# Patient Record
Sex: FEMALE | Race: WHITE | Employment: FULL TIME | URBAN - METROPOLITAN AREA
[De-identification: names, ages, dates, MRNs, and addresses within clinical notes are randomized per-mention and may not be internally consistent; named-entity substitution may affect disease eponyms.]

---

## 2017-01-18 ENCOUNTER — TELEPHONE (OUTPATIENT)
Dept: FAMILY MEDICINE CLINIC | Facility: CLINIC | Age: 57
End: 2017-01-18

## 2017-01-18 NOTE — TELEPHONE ENCOUNTER
----- Message from Kamla Marc MD sent at 12/23/2016  2:40 PM CST -----  Regarding: BP f/u  1 month ago I saw pt for sinusitis/bronchitis and her BP was high.  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

## 2017-01-25 RX ORDER — LISINOPRIL 20 MG/1
20 TABLET ORAL DAILY
Qty: 30 TABLET | Refills: 0 | Status: SHIPPED | OUTPATIENT
Start: 2017-01-25 | End: 2017-02-23 | Stop reason: SINTOL

## 2017-02-20 ENCOUNTER — HOSPITAL ENCOUNTER (OUTPATIENT)
Dept: MAMMOGRAPHY | Age: 57
Discharge: HOME | End: 2017-02-20
Attending: FAMILY MEDICINE
Payer: COMMERCIAL

## 2017-02-20 DIAGNOSIS — Z12.31 ENCOUNTER FOR SCREENING MAMMOGRAM FOR BREAST CANCER: ICD-10-CM

## 2017-02-20 PROCEDURE — 77067 SCR MAMMO BI INCL CAD: CPT

## 2017-02-22 ENCOUNTER — PATIENT MESSAGE (OUTPATIENT)
Dept: FAMILY MEDICINE CLINIC | Facility: CLINIC | Age: 57
End: 2017-02-22

## 2017-02-22 NOTE — TELEPHONE ENCOUNTER
From: Valente Xie  To: Tong Osullivan MD  Sent: 2/22/2017 7:10 AM CST  Subject: Prescription Question    Good morning Doc.  John Zazueta,  While talking with a good friend about getting her husbands blood pressure medication correct we were discussing my medicati

## 2017-02-23 ENCOUNTER — PATIENT MESSAGE (OUTPATIENT)
Dept: FAMILY MEDICINE CLINIC | Facility: CLINIC | Age: 57
End: 2017-02-23

## 2017-02-23 RX ORDER — LOSARTAN POTASSIUM 50 MG/1
50 TABLET ORAL DAILY
Qty: 30 TABLET | Refills: 1 | Status: SHIPPED | OUTPATIENT
Start: 2017-02-23 | End: 2017-04-14

## 2017-02-23 NOTE — TELEPHONE ENCOUNTER
From: Tristen Cabral  To: Willie Persaud MD  Sent: 2/23/2017 7:17 AM CST  Subject: Prescription Question    Doc Ash Caodaism,  If we could try that I would appreciate it.  My current prescription runs out in 7 days so I could try this new one and if it is crazy expe

## 2017-02-27 ENCOUNTER — HOSPITAL ENCOUNTER (OUTPATIENT)
Dept: MAMMOGRAPHY | Facility: HOSPITAL | Age: 57
Discharge: HOME | End: 2017-02-27
Attending: FAMILY MEDICINE
Payer: COMMERCIAL

## 2017-02-27 DIAGNOSIS — R92.2 INCONCLUSIVE MAMMOGRAM: ICD-10-CM

## 2017-02-27 PROCEDURE — 77065 DX MAMMO INCL CAD UNI: CPT

## 2017-04-12 ENCOUNTER — TELEPHONE (OUTPATIENT)
Dept: FAMILY MEDICINE CLINIC | Facility: CLINIC | Age: 57
End: 2017-04-12

## 2017-04-12 NOTE — TELEPHONE ENCOUNTER
Spoke with the pt started about 3 weeks ago- sore in the upper arm- feels like someone punched her in the arm, and she also gets numbness and tingling in the same arm sometimes the pain is so severe that it keeps her up at night.  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

## 2017-04-14 ENCOUNTER — OFFICE VISIT (OUTPATIENT)
Dept: FAMILY MEDICINE CLINIC | Facility: CLINIC | Age: 57
End: 2017-04-14

## 2017-04-14 ENCOUNTER — HOSPITAL ENCOUNTER (OUTPATIENT)
Dept: GENERAL RADIOLOGY | Age: 57
Discharge: HOME | End: 2017-04-14
Attending: INTERNAL MEDICINE
Payer: COMMERCIAL

## 2017-04-14 VITALS
SYSTOLIC BLOOD PRESSURE: 150 MMHG | HEART RATE: 102 BPM | BODY MASS INDEX: 27 KG/M2 | TEMPERATURE: 98 F | WEIGHT: 156 LBS | DIASTOLIC BLOOD PRESSURE: 102 MMHG

## 2017-04-14 DIAGNOSIS — M54.12 CERVICAL RADICULOPATHY: ICD-10-CM

## 2017-04-14 DIAGNOSIS — M79.601 RIGHT ARM PAIN: ICD-10-CM

## 2017-04-14 DIAGNOSIS — M06.09 RHEUMATOID ARTHRITIS OF MULTIPLE SITES WITH NEGATIVE RHEUMATOID FACTOR (HCC): ICD-10-CM

## 2017-04-14 DIAGNOSIS — I10 ESSENTIAL HYPERTENSION: Primary | ICD-10-CM

## 2017-04-14 PROCEDURE — 99213 OFFICE O/P EST LOW 20 MIN: CPT | Performed by: FAMILY MEDICINE

## 2017-04-14 PROCEDURE — 73060 X-RAY EXAM OF HUMERUS: CPT

## 2017-04-14 PROCEDURE — 72050 X-RAY EXAM NECK SPINE 4/5VWS: CPT

## 2017-04-14 RX ORDER — LOSARTAN POTASSIUM 100 MG/1
100 TABLET ORAL DAILY
Qty: 30 TABLET | Refills: 0 | Status: SHIPPED | OUTPATIENT
Start: 2017-04-14 | End: 2017-05-08 | Stop reason: ALTCHOICE

## 2017-04-14 NOTE — PROGRESS NOTES
Sandro Kern is a 60year old female. Patient presents with:  Blood Pressure: per pt       HPI:   HTN: 145/96 on Mon. 150/100 on Wed. 168/106 in the AM. Just switched from the lisinopril to losartan. No new or different headaches.  No chest pain or pr 120/90  10/03/16 : 130/80  09/28/16 : 138/88      Wt Readings from Last 6 Encounters:  04/14/17 : 156 lb  04/14/17 : 159 lb  12/23/16 : 157 lb 3.2 oz  12/14/16 : 158 lb 3.2 oz  09/28/16 : 158 lb 8 oz  08/24/16 : 153 lb 6.4 oz      REVIEW OF SYSTEMS:   GENE

## 2017-04-20 ENCOUNTER — NURSE ONLY (OUTPATIENT)
Dept: FAMILY MEDICINE CLINIC | Facility: CLINIC | Age: 57
End: 2017-04-20

## 2017-04-20 VITALS — DIASTOLIC BLOOD PRESSURE: 90 MMHG | SYSTOLIC BLOOD PRESSURE: 132 MMHG

## 2017-04-20 NOTE — PROGRESS NOTES
Patient states she has only been taking medication for 6 days. She states she is feeling fine. Notified patient that we would forward this to [facility] and call her with further guidance.

## 2017-04-21 ENCOUNTER — OFFICE VISIT (OUTPATIENT)
Dept: PHYSICAL THERAPY | Age: 57
End: 2017-04-21
Attending: INTERNAL MEDICINE
Payer: COMMERCIAL

## 2017-04-21 DIAGNOSIS — M06.09 RHEUMATOID ARTHRITIS OF MULTIPLE SITES WITH NEGATIVE RHEUMATOID FACTOR (HCC): ICD-10-CM

## 2017-04-21 DIAGNOSIS — M54.12 CERVICAL RADICULOPATHY: Primary | ICD-10-CM

## 2017-04-21 DIAGNOSIS — M79.601 RIGHT ARM PAIN: ICD-10-CM

## 2017-04-21 PROCEDURE — 97140 MANUAL THERAPY 1/> REGIONS: CPT

## 2017-04-21 PROCEDURE — 97162 PT EVAL MOD COMPLEX 30 MIN: CPT

## 2017-04-21 NOTE — PROGRESS NOTES
INITIAL EVALUATION:   Referring Physician: Dr. Mirna Geller  Diagnosis: Cervical radiculopathy (M54.12)  Rheumatoid arthritis of multiple sites with negative rheumatoid factor (HCC) (M06.09)  Right arm pain (M79.601)       Date of Service: 4/21/2017     PATIENT S function    Precautions:  RA  OBJECTIVE:   Observation/Posture:  The patient sits with fair posture with a forward and rounded shoulders    Gait: The patient ambulates with no significant gait deviations    Sensation: Light touch intact in bilateral upper e Neuromuscular Re-education; Therapeutic Activity;  Electrical Stim    Education or treatment limitation: None  Rehab Potential:good    Patient/Family/Caregiver was advised of these findings, precautions, and treatment options and has agreed to actively part

## 2017-04-21 NOTE — PROGRESS NOTES
Left message on cell phone [phone]  For the pt that her BP is better and to come in for a nurse visit in 2 weeks for a BP check- advised to call if questions

## 2017-04-25 ENCOUNTER — OFFICE VISIT (OUTPATIENT)
Dept: PHYSICAL THERAPY | Age: 57
End: 2017-04-25
Attending: INTERNAL MEDICINE
Payer: COMMERCIAL

## 2017-04-25 DIAGNOSIS — M06.09 RHEUMATOID ARTHRITIS OF MULTIPLE SITES WITH NEGATIVE RHEUMATOID FACTOR (HCC): ICD-10-CM

## 2017-04-25 DIAGNOSIS — M79.601 RIGHT ARM PAIN: ICD-10-CM

## 2017-04-25 DIAGNOSIS — M54.12 CERVICAL RADICULOPATHY: Primary | ICD-10-CM

## 2017-04-25 PROCEDURE — 97110 THERAPEUTIC EXERCISES: CPT

## 2017-04-25 PROCEDURE — 97140 MANUAL THERAPY 1/> REGIONS: CPT

## 2017-04-25 NOTE — PROGRESS NOTES
Dx: Cervical radiculopathy (M54.12)  Rheumatoid arthritis of multiple sites with negative rheumatoid factor (HCC) (M06.09)  Right arm pain (M79.601)        Authorized # of Visits:  10         Next MD visit: none scheduled  Fall Risk: standard         Preca

## 2017-05-02 ENCOUNTER — OFFICE VISIT (OUTPATIENT)
Dept: PHYSICAL THERAPY | Age: 57
End: 2017-05-02
Attending: INTERNAL MEDICINE
Payer: COMMERCIAL

## 2017-05-02 DIAGNOSIS — M79.601 RIGHT ARM PAIN: ICD-10-CM

## 2017-05-02 DIAGNOSIS — M06.09 RHEUMATOID ARTHRITIS OF MULTIPLE SITES WITH NEGATIVE RHEUMATOID FACTOR (HCC): ICD-10-CM

## 2017-05-02 DIAGNOSIS — M54.12 CERVICAL RADICULOPATHY: Primary | ICD-10-CM

## 2017-05-02 PROCEDURE — 97140 MANUAL THERAPY 1/> REGIONS: CPT

## 2017-05-02 PROCEDURE — 97110 THERAPEUTIC EXERCISES: CPT

## 2017-05-02 NOTE — PROGRESS NOTES
Dx: Cervical radiculopathy (M54.12)  Rheumatoid arthritis of multiple sites with negative rheumatoid factor (HCC) (M06.09)  Right arm pain (M79.601)        Authorized # of Visits:  10         Next MD visit: none scheduled  Fall Risk: standard         Preca 2       Total Timed Treatment: 35 min  Total Treatment Time: 35 min

## 2017-05-05 ENCOUNTER — NURSE ONLY (OUTPATIENT)
Dept: FAMILY MEDICINE CLINIC | Facility: CLINIC | Age: 57
End: 2017-05-05

## 2017-05-05 ENCOUNTER — OFFICE VISIT (OUTPATIENT)
Dept: PHYSICAL THERAPY | Age: 57
End: 2017-05-05
Attending: INTERNAL MEDICINE
Payer: COMMERCIAL

## 2017-05-05 VITALS — DIASTOLIC BLOOD PRESSURE: 84 MMHG | SYSTOLIC BLOOD PRESSURE: 160 MMHG

## 2017-05-05 DIAGNOSIS — M54.12 CERVICAL RADICULOPATHY: Primary | ICD-10-CM

## 2017-05-05 DIAGNOSIS — M06.09 RHEUMATOID ARTHRITIS OF MULTIPLE SITES WITH NEGATIVE RHEUMATOID FACTOR (HCC): ICD-10-CM

## 2017-05-05 DIAGNOSIS — Z02.9 ENCOUNTERS FOR ADMINISTRATIVE PURPOSE: Primary | ICD-10-CM

## 2017-05-05 DIAGNOSIS — M79.601 RIGHT ARM PAIN: ICD-10-CM

## 2017-05-05 PROCEDURE — 97140 MANUAL THERAPY 1/> REGIONS: CPT

## 2017-05-05 PROCEDURE — 97110 THERAPEUTIC EXERCISES: CPT

## 2017-05-05 NOTE — PROGRESS NOTES
Pt here for a blood pressure check. Pt was at physical therapy at 3pm, then sat for about 30 mins when I called her back. I took the first bp on her left arm with a adult cuff 160/90. She denied headache, chest pain, SOB.  I talked to Dr. Sathya Wheeler, she wanted h

## 2017-05-05 NOTE — PROGRESS NOTES
Dx: Cervical radiculopathy (M54.12)  Rheumatoid arthritis of multiple sites with negative rheumatoid factor (HCC) (M06.09)  Right arm pain (M79.601)        Authorized # of Visits:  10         Next MD visit: none scheduled  Fall Risk: standard         Preca will be discharged from outpatient physical therapy and continue independently with a home exercise program      Date: 4/25/2017  Tx#: 2/10 Date:  5/2/2017   Tx#: 3/10 Date:  5/5/2017   Tx#: 4/10 Date: Tx#: 5/ Date: Tx#: 6/ Date: Tx#: 7/ Date:    Tx#:

## 2017-05-05 NOTE — PROGRESS NOTES
BP not quite where it should be. I'd like to add a mild diuretic to her losartan. She may have to urinate more at first, but body often adjusts.  We can add it to her losartan, combined in one pill losartan/hctz 100/25mg #30 1 refill, schedule nurse visit i

## 2017-05-08 ENCOUNTER — TELEPHONE (OUTPATIENT)
Dept: FAMILY MEDICINE CLINIC | Facility: CLINIC | Age: 57
End: 2017-05-08

## 2017-05-08 RX ORDER — LOSARTAN POTASSIUM AND HYDROCHLOROTHIAZIDE 25; 100 MG/1; MG/1
1 TABLET ORAL DAILY
Qty: 30 TABLET | Refills: 1 | Status: SHIPPED | OUTPATIENT
Start: 2017-05-08 | End: 2017-07-03

## 2017-05-08 NOTE — TELEPHONE ENCOUNTER
Progress Notes by Lady Aguilar MD at 5/5/2017  5:34 PM        Author: Lady Aguilar MD Author Type: Physician Filed: 5/5/2017  5:36 PM       Note Status: Signed Cosign: Cosign Not Required Note Time: 5/5/2017  5:34 PM       : Lady Aguilar MD (Ph

## 2017-05-08 NOTE — TELEPHONE ENCOUNTER
Patient notified and verbalized understanding. Requests script to [facility].  -RX sent  Nurse visit scheduled  Future Appointments  Date Time Provider Haroon Collins   5/22/2017 3:00 PM [facility] NURSE DIANE Rosales   8/10/2017 3:30 PM S

## 2017-05-22 ENCOUNTER — NURSE ONLY (OUTPATIENT)
Dept: FAMILY MEDICINE CLINIC | Facility: CLINIC | Age: 57
End: 2017-05-22

## 2017-05-22 VITALS — DIASTOLIC BLOOD PRESSURE: 88 MMHG | SYSTOLIC BLOOD PRESSURE: 140 MMHG

## 2017-05-22 DIAGNOSIS — Z02.9 ENCOUNTERS FOR ADMINISTRATIVE PURPOSE: Primary | ICD-10-CM

## 2017-05-22 NOTE — PROGRESS NOTES
Pt here for BP check- she is on new medication- lostartan hctz  She denies any lightheeaded, chest pain, headache    She doesn't check her BP at home

## 2017-05-22 NOTE — PROGRESS NOTES
BP much better, still a bit high, but no danger zone. Does she have anything else to work on to help bring it down (stress reduction, enough sleep, limiting salt, eating lots of plants, getting exercise)?  If she does have stuff to work on do that first and

## 2017-07-03 RX ORDER — LOSARTAN POTASSIUM AND HYDROCHLOROTHIAZIDE 25; 100 MG/1; MG/1
TABLET ORAL
Qty: 30 TABLET | Refills: 0 | Status: SHIPPED | OUTPATIENT
Start: 2017-07-03 | End: 2017-08-03

## 2017-07-03 NOTE — TELEPHONE ENCOUNTER
Last OV 4/14/17, Future Appointments  Date Time Provider Haroon Karina   8/10/2017 3:30 PM Warren Olmedo MD [facility]   8/28/2017 3:20 PM Anna Reese MD [facility]       Last rx given 5/8/17

## 2017-07-29 RX ORDER — LOSARTAN POTASSIUM AND HYDROCHLOROTHIAZIDE 25; 100 MG/1; MG/1
TABLET ORAL
Qty: 30 TABLET | Refills: 0 | OUTPATIENT
Start: 2017-07-29

## 2017-08-16 ENCOUNTER — TELEPHONE (OUTPATIENT)
Dept: FAMILY MEDICINE CLINIC | Facility: CLINIC | Age: 57
End: 2017-08-16

## 2017-08-16 ENCOUNTER — HOSPITAL ENCOUNTER (OUTPATIENT)
Dept: MAMMOGRAPHY | Facility: HOSPITAL | Age: 57
Discharge: HOME | End: 2017-08-16
Attending: FAMILY MEDICINE
Payer: COMMERCIAL

## 2017-08-16 DIAGNOSIS — R92.8 ABNORMAL MAMMOGRAM OF LEFT BREAST: ICD-10-CM

## 2017-08-16 PROCEDURE — 77065 DX MAMMO INCL CAD UNI: CPT | Performed by: FAMILY MEDICINE

## 2017-08-16 PROCEDURE — 77061 BREAST TOMOSYNTHESIS UNI: CPT | Performed by: FAMILY MEDICINE

## 2017-08-16 NOTE — IMAGING NOTE
Asssisted Dr. Pedro Layton with recommendation for a left stereotactic breast biopsy for calcifications. Emotional and educational support provided. Written information provided to [employer].  Our breast center schedulers will call pt within 72 hours to aim

## 2017-08-16 NOTE — TELEPHONE ENCOUNTER
[facility] called and spoke with Dr. Ailyn Moreno- he is recommending a biopsy of the left breast- there is a tiny cluster of calcifications- he believes to be benign but want to be cautious.  They will be sending the request

## 2017-08-21 ENCOUNTER — HOSPITAL ENCOUNTER (OUTPATIENT)
Dept: MAMMOGRAPHY | Facility: HOSPITAL | Age: 57
Discharge: HOME | End: 2017-08-21
Attending: FAMILY MEDICINE
Payer: COMMERCIAL

## 2017-08-21 DIAGNOSIS — R92.1 BREAST CALCIFICATIONS: ICD-10-CM

## 2017-08-21 PROCEDURE — 88305 TISSUE EXAM BY PATHOLOGIST: CPT | Performed by: FAMILY MEDICINE

## 2017-08-21 PROCEDURE — 19081 BX BREAST 1ST LESION STRTCTC: CPT | Performed by: FAMILY MEDICINE

## 2017-08-23 ENCOUNTER — TELEPHONE (OUTPATIENT)
Dept: MAMMOGRAPHY | Facility: HOSPITAL | Age: 57
End: 2017-08-23

## 2017-08-23 NOTE — TELEPHONE ENCOUNTER
Rec'd call back from Health system and name,  verified with pt. Notified Health system of benign left breast stereotactic biopsy result. Health system reports biopsy site is healing well. Hematoma management discussed.  Radiologist recommends next mammo

## 2017-08-28 ENCOUNTER — MED REC SCAN ONLY (OUTPATIENT)
Dept: FAMILY MEDICINE CLINIC | Facility: CLINIC | Age: 57
End: 2017-08-28

## 2017-10-25 RX ORDER — LOSARTAN POTASSIUM AND HYDROCHLOROTHIAZIDE 25; 100 MG/1; MG/1
TABLET ORAL
Qty: 30 TABLET | Refills: 5 | Status: SHIPPED | OUTPATIENT
Start: 2017-10-25 | End: 2018-05-05

## 2017-10-25 NOTE — TELEPHONE ENCOUNTER
Last OV 4/14/17, Future Appointments  Date Time Provider Haroon Karina   2/28/2018 3:20 PM Jonelle Carlin MD [facility]   9/6/2018 3:30 PM MD Donte Lucero       Last rx given 8/3/17

## 2018-02-28 PROCEDURE — 81001 URINALYSIS AUTO W/SCOPE: CPT | Performed by: INTERNAL MEDICINE

## 2018-02-28 PROCEDURE — 84156 ASSAY OF PROTEIN URINE: CPT | Performed by: INTERNAL MEDICINE

## 2018-02-28 PROCEDURE — 82570 ASSAY OF URINE CREATININE: CPT | Performed by: INTERNAL MEDICINE

## 2018-03-05 ENCOUNTER — HOSPITAL ENCOUNTER (OUTPATIENT)
Dept: MAMMOGRAPHY | Facility: HOSPITAL | Age: 58
Discharge: HOME | End: 2018-03-05
Attending: FAMILY MEDICINE
Payer: COMMERCIAL

## 2018-03-05 DIAGNOSIS — R92.8 ABNORMAL MAMMOGRAM OF LEFT BREAST: ICD-10-CM

## 2018-03-05 PROCEDURE — 77061 BREAST TOMOSYNTHESIS UNI: CPT | Performed by: FAMILY MEDICINE

## 2018-03-05 PROCEDURE — 77065 DX MAMMO INCL CAD UNI: CPT | Performed by: FAMILY MEDICINE

## 2018-05-05 RX ORDER — LOSARTAN POTASSIUM AND HYDROCHLOROTHIAZIDE 25; 100 MG/1; MG/1
TABLET ORAL
Qty: 30 TABLET | Refills: 4 | Status: SHIPPED | OUTPATIENT
Start: 2018-05-05 | End: 2018-10-04

## 2018-05-05 NOTE — TELEPHONE ENCOUNTER
Last refilled on 10/25/17 for # 30 with 5 refills  Last BUN 22, creatinine 0.86 on 2/28/18  Last seen on 4/14/17  /90 on 2/28/18  Future Appointments  Date Time Provider Haroon Collins   5/9/2018 2:45 PM Aneesh Bartlett MD [facility]   8/28

## 2018-05-09 ENCOUNTER — OFFICE VISIT (OUTPATIENT)
Dept: FAMILY MEDICINE CLINIC | Facility: CLINIC | Age: 58
End: 2018-05-09

## 2018-05-09 ENCOUNTER — TELEPHONE (OUTPATIENT)
Dept: FAMILY MEDICINE CLINIC | Facility: CLINIC | Age: 58
End: 2018-05-09

## 2018-05-09 VITALS
TEMPERATURE: 98 F | SYSTOLIC BLOOD PRESSURE: 140 MMHG | BODY MASS INDEX: 27.65 KG/M2 | DIASTOLIC BLOOD PRESSURE: 90 MMHG | HEART RATE: 64 BPM | WEIGHT: 158 LBS | HEIGHT: 63.5 IN | RESPIRATION RATE: 14 BRPM

## 2018-05-09 DIAGNOSIS — R68.89 DECREASED EXERCISE TOLERANCE: ICD-10-CM

## 2018-05-09 DIAGNOSIS — Z13.29 THYROID DISORDER SCREEN: ICD-10-CM

## 2018-05-09 DIAGNOSIS — Z12.39 SCREENING BREAST EXAMINATION: ICD-10-CM

## 2018-05-09 DIAGNOSIS — M06.00 SERONEGATIVE RHEUMATOID ARTHRITIS (HCC): ICD-10-CM

## 2018-05-09 DIAGNOSIS — M25.562 CHRONIC PAIN OF LEFT KNEE: ICD-10-CM

## 2018-05-09 DIAGNOSIS — G89.29 CHRONIC PAIN OF LEFT KNEE: ICD-10-CM

## 2018-05-09 DIAGNOSIS — I83.93 VARICOSE VEINS OF BOTH LOWER EXTREMITIES: ICD-10-CM

## 2018-05-09 DIAGNOSIS — I10 ESSENTIAL HYPERTENSION: Primary | ICD-10-CM

## 2018-05-09 DIAGNOSIS — I10 ESSENTIAL HYPERTENSION, BENIGN: ICD-10-CM

## 2018-05-09 DIAGNOSIS — Z12.4 CERVICAL CANCER SCREENING: ICD-10-CM

## 2018-05-09 DIAGNOSIS — Z13.220 LIPID SCREENING: ICD-10-CM

## 2018-05-09 DIAGNOSIS — Z13.21 ENCOUNTER FOR VITAMIN DEFICIENCY SCREENING: ICD-10-CM

## 2018-05-09 DIAGNOSIS — Z00.00 ROUTINE HISTORY AND PHYSICAL EXAMINATION OF ADULT: Primary | ICD-10-CM

## 2018-05-09 DIAGNOSIS — Z91.030 BEE STING ALLERGY: ICD-10-CM

## 2018-05-09 DIAGNOSIS — Z13.1 DIABETES MELLITUS SCREENING: ICD-10-CM

## 2018-05-09 PROCEDURE — 36415 COLL VENOUS BLD VENIPUNCTURE: CPT | Performed by: FAMILY MEDICINE

## 2018-05-09 PROCEDURE — 80061 LIPID PANEL: CPT | Performed by: FAMILY MEDICINE

## 2018-05-09 PROCEDURE — 82306 VITAMIN D 25 HYDROXY: CPT | Performed by: FAMILY MEDICINE

## 2018-05-09 PROCEDURE — 84443 ASSAY THYROID STIM HORMONE: CPT | Performed by: FAMILY MEDICINE

## 2018-05-09 PROCEDURE — 99396 PREV VISIT EST AGE 40-64: CPT | Performed by: FAMILY MEDICINE

## 2018-05-09 PROCEDURE — 88175 CYTOPATH C/V AUTO FLUID REDO: CPT | Performed by: FAMILY MEDICINE

## 2018-05-09 PROCEDURE — 87624 HPV HI-RISK TYP POOLED RSLT: CPT | Performed by: FAMILY MEDICINE

## 2018-05-09 PROCEDURE — 83036 HEMOGLOBIN GLYCOSYLATED A1C: CPT | Performed by: FAMILY MEDICINE

## 2018-05-09 RX ORDER — AMINO ACIDS/MV,IRON,MIN
TABLET ORAL
COMMUNITY

## 2018-05-09 RX ORDER — EPINEPHRINE 0.3 MG/.3ML
0.3 INJECTION SUBCUTANEOUS ONCE
Qty: 1 EACH | Refills: 0 | Status: SHIPPED | OUTPATIENT
Start: 2018-05-09 | End: 2018-05-09

## 2018-05-09 RX ORDER — SPIRONOLACTONE 50 MG/1
TABLET, FILM COATED ORAL
Qty: 30 TABLET | Refills: 0 | Status: SHIPPED | OUTPATIENT
Start: 2018-05-09 | End: 2018-06-08

## 2018-05-09 NOTE — TELEPHONE ENCOUNTER
Nurse visit on 6/11 for BP and blood work  No orders in [employer]. Thanks!     Future Appointments  Date Time Provider Haroon Collins   6/11/2018 11:00 AM [facility] [place] NURSE [facility] Mat[facility] Carlin   8/28/2018 3:40 PM Marilyn Cantu MD [facility]

## 2018-05-09 NOTE — PROGRESS NOTES
HPI:   Isreal Tse is a 59year old female who presents for a complete physical exam.  Patient complains of a few things for me:    1) She updates me rheum is treating her for OA nad RA, on plaquenil (getting regular eye exams) and sulfsalazine, aleve fo Value   02/28/2018 28   08/28/2017 28   04/14/2017 35   ----------  ALT (SGPT) (IU/L)   Date Value   06/20/2016 22   02/24/2016 17   11/24/2015 19   ----------  ALT (U/L)   Date Value   02/28/2018 30   08/28/2017 31   04/14/2017 39   ----------       Wong Womack Packs/day: 0.00      Years: 2.00         Quit date: 1/1/1980  Smokeless tobacco: Never Used                      Alcohol use: Yes           0.0 oz/week     Comment: 1-2/day    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 The patient is asked to return for CPX in 1 year.   Call in 1 week for results if hasn't heard from us by then

## 2018-05-09 NOTE — TELEPHONE ENCOUNTER
FYI:    PT COMING IN ON 6/11/18  NURSE CHRISTINA FOR F/U BP AND BLOOD WORK  PLEASE PLACE ORDER    THANK ANABELA BARFIELD

## 2018-06-09 RX ORDER — SPIRONOLACTONE 50 MG/1
50 TABLET, FILM COATED ORAL DAILY
Qty: 30 TABLET | Refills: 0 | Status: SHIPPED | OUTPATIENT
Start: 2018-06-09 | End: 2018-07-09

## 2018-06-09 NOTE — TELEPHONE ENCOUNTER
Patient advised. States she has an appointment scheduled.    Future Appointments  Date Time Provider Haroon Collins   6/11/2018 11:00 AM [facility] NURSE DIANE Woo   6/27/2018 11:00 AM YK CARD TM/STRESS/ECHO RM 1 YK CARD Liz   8/28/201

## 2018-06-11 ENCOUNTER — NURSE ONLY (OUTPATIENT)
Dept: FAMILY MEDICINE CLINIC | Facility: CLINIC | Age: 58
End: 2018-06-11

## 2018-06-11 VITALS — DIASTOLIC BLOOD PRESSURE: 74 MMHG | SYSTOLIC BLOOD PRESSURE: 128 MMHG

## 2018-06-11 DIAGNOSIS — I10 ESSENTIAL HYPERTENSION: ICD-10-CM

## 2018-06-11 PROCEDURE — 36415 COLL VENOUS BLD VENIPUNCTURE: CPT | Performed by: FAMILY MEDICINE

## 2018-06-11 PROCEDURE — 80048 BASIC METABOLIC PNL TOTAL CA: CPT | Performed by: FAMILY MEDICINE

## 2018-06-11 NOTE — PROGRESS NOTES
Patient in office for BP check and BMP. Patient was drawn in the right arm 1 mint tube collected. Patient tolerated well. BP was checked and 128/74 instructed patient I would send to [address] and we would call with any recommendations.  Patient notified and v

## 2018-06-27 ENCOUNTER — HOSPITAL ENCOUNTER (OUTPATIENT)
Dept: CV DIAGNOSTICS | Age: 58
Discharge: HOME | End: 2018-06-27
Attending: FAMILY MEDICINE
Payer: COMMERCIAL

## 2018-06-27 DIAGNOSIS — R68.89 DECREASED EXERCISE TOLERANCE: ICD-10-CM

## 2018-06-27 DIAGNOSIS — I10 ESSENTIAL HYPERTENSION, BENIGN: ICD-10-CM

## 2018-06-27 PROCEDURE — 93350 STRESS TTE ONLY: CPT | Performed by: FAMILY MEDICINE

## 2018-06-27 PROCEDURE — 93017 CV STRESS TEST TRACING ONLY: CPT | Performed by: FAMILY MEDICINE

## 2018-06-27 PROCEDURE — 93018 CV STRESS TEST I&R ONLY: CPT | Performed by: FAMILY MEDICINE

## 2018-06-28 ENCOUNTER — TELEPHONE (OUTPATIENT)
Dept: FAMILY MEDICINE CLINIC | Facility: CLINIC | Age: 58
End: 2018-06-28

## 2018-06-28 NOTE — TELEPHONE ENCOUNTER
----- Message from Konstantin Yarbrough DO sent at 6/28/2018  8:06 AM CDT -----  Can notify Graeme Bud, she had a  Very good and negative stress test, no significant  EKG changes a couple of palpitations   But other than that, it looked great as did the US part of the

## 2018-07-03 ENCOUNTER — APPOINTMENT (OUTPATIENT)
Dept: GENERAL RADIOLOGY | Age: 58
End: 2018-07-03
Attending: FAMILY MEDICINE
Payer: COMMERCIAL

## 2018-07-03 ENCOUNTER — HOSPITAL ENCOUNTER (OUTPATIENT)
Age: 58
Discharge: HOME | End: 2018-07-03
Attending: FAMILY MEDICINE
Payer: COMMERCIAL

## 2018-07-03 VITALS
TEMPERATURE: 98 F | OXYGEN SATURATION: 99 % | HEART RATE: 66 BPM | DIASTOLIC BLOOD PRESSURE: 89 MMHG | SYSTOLIC BLOOD PRESSURE: 138 MMHG | BODY MASS INDEX: 27 KG/M2 | WEIGHT: 154 LBS | RESPIRATION RATE: 16 BRPM

## 2018-07-03 DIAGNOSIS — S79.922A INJURY OF LEFT THIGH, INITIAL ENCOUNTER: ICD-10-CM

## 2018-07-03 DIAGNOSIS — S76.312A HAMSTRING MUSCLE STRAIN, LEFT, INITIAL ENCOUNTER: Primary | ICD-10-CM

## 2018-07-03 PROCEDURE — 96372 THER/PROPH/DIAG INJ SC/IM: CPT

## 2018-07-03 PROCEDURE — 99214 OFFICE O/P EST MOD 30 MIN: CPT

## 2018-07-03 PROCEDURE — 99204 OFFICE O/P NEW MOD 45 MIN: CPT

## 2018-07-03 PROCEDURE — 73552 X-RAY EXAM OF FEMUR 2/>: CPT | Performed by: FAMILY MEDICINE

## 2018-07-03 RX ORDER — HYDROCODONE BITARTRATE AND ACETAMINOPHEN 5; 325 MG/1; MG/1
1 TABLET ORAL AS NEEDED
Qty: 10 TABLET | Refills: 0 | Status: SHIPPED | OUTPATIENT
Start: 2018-07-03 | End: 2019-09-17

## 2018-07-03 RX ORDER — METAXALONE 800 MG/1
800 TABLET ORAL 3 TIMES DAILY
Qty: 21 TABLET | Refills: 0 | Status: SHIPPED | OUTPATIENT
Start: 2018-07-03 | End: 2018-07-10

## 2018-07-03 RX ORDER — KETOROLAC TROMETHAMINE 30 MG/ML
60 INJECTION, SOLUTION INTRAMUSCULAR; INTRAVENOUS ONCE
Status: COMPLETED | OUTPATIENT
Start: 2018-07-03 | End: 2018-07-03
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
## 2018-07-03 NOTE — ED PROVIDER NOTES
Patient Seen [place] [id number] [facility]    History   Patient presents with:  Fall (musculoskeletal, neurologic)  Pain (neurologic)    Stated Complaint: leg pain/fell     HPI    42-year-old female presents to the immediate care today with chief c Device: n/a    Current:/89   Pulse 66   Temp 98.3 °F (36.8 °C) (Temporal)   Resp 16   Wt 69.9 kg   SpO2 99%   BMI 26.85 kg/m²         Physical Exam    GENERAL: well developed, well nourished,in no apparent distress  SKIN: no rashes,no suspicious lesi CONCLUSION:  No evidence of acute fracture or dislocation. If clinical symptoms persist then consider MRI.    Dictated by: Angelina Perez MD on 7/03/2018 at 10:39     Approved by: Angelina Perez MD           ED Course as of Jul 03 1228  ----------------------- not improve        Medications Prescribed:  Discharge Medication List as of 7/3/2018 11:03 AM    START taking these medications    Metaxalone (SKELAXIN) 800 MG Oral Tab  Take 1 tablet (800 mg total) by mouth 3 (three) times daily. , Normal, Disp-21 tablet,

## 2018-07-09 NOTE — TELEPHONE ENCOUNTER
Last refilled on 6/9/18 for # 30 with 0 refills  Last BUN 21, creatinine 0.784 on 6/11/18  Last seen on 5/9/18, /74  Future Appointments  Date Time Provider Haroon Collins   8/28/2018 3:40 PM Obed Meléndez MD [facility]   9/6/2018 3:30

## 2018-07-10 RX ORDER — SPIRONOLACTONE 50 MG/1
TABLET, FILM COATED ORAL
Qty: 90 TABLET | Refills: 3 | Status: SHIPPED | OUTPATIENT
Start: 2018-07-10 | End: 2019-07-02

## 2018-10-04 RX ORDER — LOSARTAN POTASSIUM AND HYDROCHLOROTHIAZIDE 25; 100 MG/1; MG/1
TABLET ORAL
Qty: 90 TABLET | Refills: 1 | Status: SHIPPED | OUTPATIENT
Start: 2018-10-04 | End: 2019-04-01

## 2018-10-04 NOTE — TELEPHONE ENCOUNTER
Last refilled on 5/5/18 for # 30 with 4 refills  Last BUN 27, creatinine 0.77 on 8/28/18  Last seen on 5/9/18, /72  Future Appointments   Date Time Provider Haroon Collins   2/26/2019  3:20 PM Moises Gipson MD [facility]   9/12/2019  3:

## 2018-11-12 ENCOUNTER — TELEPHONE (OUTPATIENT)
Dept: FAMILY MEDICINE CLINIC | Facility: CLINIC | Age: 58
End: 2018-11-12

## 2018-11-12 NOTE — TELEPHONE ENCOUNTER
Letter mailed to patient to call office regarding a potential recall of Losartan/HCTZ. Patient should contact their pharmacy and or providers for alternative treatment prior to returning their medications.    Patient should continue taking medication, as

## 2018-11-19 ENCOUNTER — TELEPHONE (OUTPATIENT)
Dept: FAMILY MEDICINE CLINIC | Facility: CLINIC | Age: 58
End: 2018-11-19

## 2018-11-19 NOTE — TELEPHONE ENCOUNTER
Spoke with the pt and advised that her BP medication losartan/hctz could possibly be involved with a recall and that she needs to call her pharmacy to see if her medication is affected by the recall- she v/u

## 2018-11-19 NOTE — TELEPHONE ENCOUNTER
Patient received a letter from our office stating that one of her medications has been recalled and to call our office regarding this. Please call patient back to advise.

## 2019-03-05 ENCOUNTER — TELEPHONE (OUTPATIENT)
Dept: FAMILY MEDICINE CLINIC | Facility: CLINIC | Age: 59
End: 2019-03-05

## 2019-03-05 DIAGNOSIS — Z12.31 SCREENING MAMMOGRAM, ENCOUNTER FOR: Primary | ICD-10-CM

## 2019-03-05 NOTE — TELEPHONE ENCOUNTER
PT WOULD LIKE TO SCHEDULE YEARLY MAMMOGRAM. LAST MAMMO  DONE ON 3/5/18    PLEASE PLACE ORDER SO PT CAN SCHEDULE    THANK YOU

## 2019-03-14 ENCOUNTER — HOSPITAL ENCOUNTER (OUTPATIENT)
Dept: MAMMOGRAPHY | Age: 59
Discharge: HOME | End: 2019-03-14
Attending: FAMILY MEDICINE
Payer: COMMERCIAL

## 2019-03-14 DIAGNOSIS — Z12.31 SCREENING MAMMOGRAM, ENCOUNTER FOR: ICD-10-CM

## 2019-03-14 PROCEDURE — 77067 SCR MAMMO BI INCL CAD: CPT | Performed by: FAMILY MEDICINE

## 2019-04-01 RX ORDER — LOSARTAN POTASSIUM AND HYDROCHLOROTHIAZIDE 25; 100 MG/1; MG/1
TABLET ORAL
Qty: 90 TABLET | Refills: 0 | Status: SHIPPED | OUTPATIENT
Start: 2019-04-01 | End: 2019-07-02

## 2019-04-01 NOTE — TELEPHONE ENCOUNTER
Pt advised and v/u. Appointment scheduled.     Future Appointments   Date Time Provider Haroon Collins   5/1/2019  2:30 PM Mandy Decker MD [facility]   9/12/2019  3:00 PM Altagracia Hagan MD [facility]   9/17/2019  3:20 PM Bradford Solorzano

## 2019-04-01 NOTE — TELEPHONE ENCOUNTER
Last refilled on 10/4/18 for # 90 with 1 refills  Last OV 5/9/18, /76  Future Appointments   Date Time Provider Haroon Karina   9/12/2019  3:00 PM Neville Cullen MD [facility]   9/17/2019  3:20 PM Yasmin De Oliveira MD [facility]

## 2019-04-22 ENCOUNTER — MED REC SCAN ONLY (OUTPATIENT)
Dept: FAMILY MEDICINE CLINIC | Facility: CLINIC | Age: 59
End: 2019-04-22

## 2019-05-01 ENCOUNTER — OFFICE VISIT (OUTPATIENT)
Dept: FAMILY MEDICINE CLINIC | Facility: CLINIC | Age: 59
End: 2019-05-01
Payer: COMMERCIAL

## 2019-05-01 VITALS
HEART RATE: 62 BPM | DIASTOLIC BLOOD PRESSURE: 74 MMHG | WEIGHT: 157.63 LBS | SYSTOLIC BLOOD PRESSURE: 110 MMHG | TEMPERATURE: 98 F | BODY MASS INDEX: 27.58 KG/M2 | RESPIRATION RATE: 12 BRPM | HEIGHT: 63.5 IN

## 2019-05-01 DIAGNOSIS — M06.00 SERONEGATIVE RHEUMATOID ARTHRITIS (HCC): ICD-10-CM

## 2019-05-01 DIAGNOSIS — L84 PRE-ULCERATIVE CORN OR CALLOUS: ICD-10-CM

## 2019-05-01 DIAGNOSIS — I10 ESSENTIAL HYPERTENSION, BENIGN: Primary | ICD-10-CM

## 2019-05-01 PROCEDURE — 99214 OFFICE O/P EST MOD 30 MIN: CPT | Performed by: FAMILY MEDICINE

## 2019-05-01 NOTE — PROGRESS NOTES
Marita Martinez is a 59year old female. HPI:   Pt is here to f/u on BP.     Current prescribed medication:    Current Outpatient Medications:   •  LOSARTAN POTASSIUM-HCTZ 100-25 MG Oral Tab, TAKE ONE TABLET BY MOUTH DAILY, Disp: 90 tablet, Rfl: 0  •  sulfaS hemoglobin  A1c for diagnosis of diabetes for children.        HgbA1C   Date Value Ref Range Status   05/09/2018 5.5 <5.7 % Final     Comment:        Normal HbA1C:     <5.7%   Pre-Diabetic:     5.7 - 6.4%   Diabetic:         >6.4%   Diabetic Control: <7.0% use: Yes      Alcohol/week: 0.0 oz      Comment: 1-2/day    Drug use: No         REVIEW OF SYSTEMS:   GENERAL HEALTH: feels well in general  SKIN: denies any unusual skin lesions or rashes  RESPIRATORY: denies shortness of breath with exertion; no chronic

## 2019-07-02 RX ORDER — LOSARTAN POTASSIUM AND HYDROCHLOROTHIAZIDE 25; 100 MG/1; MG/1
TABLET ORAL
Qty: 30 TABLET | Refills: 0 | Status: SHIPPED | OUTPATIENT
Start: 2019-07-02 | End: 2019-08-10

## 2019-07-02 RX ORDER — SPIRONOLACTONE 50 MG/1
TABLET, FILM COATED ORAL
Qty: 30 TABLET | Refills: 2 | Status: SHIPPED | OUTPATIENT
Start: 2019-07-02 | End: 2019-09-30

## 2019-07-02 NOTE — TELEPHONE ENCOUNTER
Last refill on Spironolactone #90 with 3 refills on 7 10 2018. Last refill on Losartan- HCTZ #90 with 0 refills on 4 1 2019.   Last OV on 5 1 2019

## 2019-08-11 RX ORDER — LOSARTAN POTASSIUM AND HYDROCHLOROTHIAZIDE 25; 100 MG/1; MG/1
TABLET ORAL
Qty: 90 TABLET | Refills: 1 | Status: SHIPPED | OUTPATIENT
Start: 2019-08-11 | End: 2019-09-04 | Stop reason: ALTCHOICE

## 2019-09-04 ENCOUNTER — TELEPHONE (OUTPATIENT)
Dept: FAMILY MEDICINE CLINIC | Facility: CLINIC | Age: 59
End: 2019-09-04

## 2019-09-04 RX ORDER — HYDROCHLOROTHIAZIDE 25 MG/1
25 TABLET ORAL DAILY
Qty: 90 TABLET | Refills: 0 | Status: SHIPPED | OUTPATIENT
Start: 2019-09-04 | End: 2019-12-01

## 2019-09-04 RX ORDER — LOSARTAN POTASSIUM 100 MG/1
100 TABLET ORAL DAILY
Qty: 90 TABLET | Refills: 0 | Status: SHIPPED | OUTPATIENT
Start: 2019-09-04 | End: 2019-12-01

## 2019-09-17 PROCEDURE — 81001 URINALYSIS AUTO W/SCOPE: CPT | Performed by: INTERNAL MEDICINE

## 2019-09-17 PROCEDURE — 84156 ASSAY OF PROTEIN URINE: CPT | Performed by: INTERNAL MEDICINE

## 2019-10-01 RX ORDER — SPIRONOLACTONE 50 MG/1
TABLET, FILM COATED ORAL
Qty: 90 TABLET | Refills: 1 | Status: SHIPPED | OUTPATIENT
Start: 2019-10-01 | End: 2020-03-30

## 2019-10-11 ENCOUNTER — OFFICE VISIT (OUTPATIENT)
Dept: SPORTS MEDICINE | Age: 59
End: 2019-10-11

## 2019-10-11 ENCOUNTER — IMAGING SERVICES (OUTPATIENT)
Dept: GENERAL RADIOLOGY | Age: 59
End: 2019-10-11
Attending: PEDIATRICS

## 2019-10-11 VITALS
DIASTOLIC BLOOD PRESSURE: 74 MMHG | RESPIRATION RATE: 16 BRPM | BODY MASS INDEX: 25.61 KG/M2 | SYSTOLIC BLOOD PRESSURE: 126 MMHG | WEIGHT: 150 LBS | HEART RATE: 68 BPM | HEIGHT: 64 IN

## 2019-10-11 DIAGNOSIS — M25.562 ACUTE PAIN OF LEFT KNEE: ICD-10-CM

## 2019-10-11 DIAGNOSIS — M23.204 DEGENERATIVE TEAR OF LEFT MEDIAL MENISCUS: ICD-10-CM

## 2019-10-11 DIAGNOSIS — M17.12 PRIMARY OSTEOARTHRITIS OF LEFT KNEE: Primary | ICD-10-CM

## 2019-10-11 PROCEDURE — 99204 OFFICE O/P NEW MOD 45 MIN: CPT | Performed by: PEDIATRICS

## 2019-10-11 PROCEDURE — 73564 X-RAY EXAM KNEE 4 OR MORE: CPT | Performed by: RADIOLOGY

## 2019-10-11 RX ORDER — ACETAMINOPHEN 500 MG
1000 TABLET ORAL EVERY 6 HOURS PRN
COMMUNITY

## 2019-10-11 RX ORDER — EPINEPHRINE 0.3 MG/.3ML
INJECTION SUBCUTANEOUS
COMMUNITY

## 2019-10-11 RX ORDER — HYDROXYCHLOROQUINE SULFATE 200 MG/1
TABLET, FILM COATED ORAL
COMMUNITY
Start: 2017-10-25

## 2019-10-11 RX ORDER — SULFASALAZINE 500 MG/1
TABLET ORAL
COMMUNITY
Start: 2013-11-25

## 2019-10-11 RX ORDER — SPIRONOLACTONE 50 MG/1
TABLET, FILM COATED ORAL
COMMUNITY
Start: 2019-10-01

## 2019-10-11 RX ORDER — LOSARTAN POTASSIUM AND HYDROCHLOROTHIAZIDE 25; 100 MG/1; MG/1
TABLET ORAL
COMMUNITY
Start: 2017-10-25

## 2019-10-11 RX ORDER — LOSARTAN POTASSIUM 100 MG/1
100 TABLET ORAL
COMMUNITY
Start: 2019-09-04

## 2019-10-14 ENCOUNTER — TELEPHONE (OUTPATIENT)
Dept: SCHEDULING | Age: 59
End: 2019-10-14

## 2019-10-17 ENCOUNTER — EXTERNAL RECORD (OUTPATIENT)
Dept: HEALTH INFORMATION MANAGEMENT | Facility: OTHER | Age: 59
End: 2019-10-17

## 2019-11-21 ENCOUNTER — OFFICE VISIT (OUTPATIENT)
Dept: SPORTS MEDICINE | Age: 59
End: 2019-11-21

## 2019-11-21 VITALS — DIASTOLIC BLOOD PRESSURE: 70 MMHG | SYSTOLIC BLOOD PRESSURE: 116 MMHG

## 2019-11-21 DIAGNOSIS — M17.12 PRIMARY OSTEOARTHRITIS OF LEFT KNEE: Primary | ICD-10-CM

## 2019-11-21 DIAGNOSIS — M23.204 DEGENERATIVE TEAR OF LEFT MEDIAL MENISCUS: ICD-10-CM

## 2019-11-21 PROCEDURE — 99213 OFFICE O/P EST LOW 20 MIN: CPT | Performed by: PEDIATRICS

## 2019-12-02 RX ORDER — LOSARTAN POTASSIUM 100 MG/1
TABLET ORAL
Qty: 90 TABLET | Refills: 0 | Status: SHIPPED | OUTPATIENT
Start: 2019-12-02 | End: 2020-03-04

## 2019-12-02 RX ORDER — HYDROCHLOROTHIAZIDE 25 MG/1
TABLET ORAL
Qty: 90 TABLET | Refills: 0 | Status: SHIPPED | OUTPATIENT
Start: 2019-12-02 | End: 2020-03-04

## 2019-12-02 NOTE — TELEPHONE ENCOUNTER
Losartan and Hydroxychlorothiazide both last refilled 9/4/19 with 0 refills both for #90  Last OV 5/1/19  Future appt 12/13/19

## 2019-12-13 ENCOUNTER — OFFICE VISIT (OUTPATIENT)
Dept: FAMILY MEDICINE CLINIC | Facility: CLINIC | Age: 59
End: 2019-12-13
Payer: COMMERCIAL

## 2019-12-13 VITALS
OXYGEN SATURATION: 99 % | RESPIRATION RATE: 18 BRPM | BODY MASS INDEX: 26.26 KG/M2 | HEIGHT: 64 IN | HEART RATE: 76 BPM | DIASTOLIC BLOOD PRESSURE: 74 MMHG | TEMPERATURE: 98 F | SYSTOLIC BLOOD PRESSURE: 134 MMHG | WEIGHT: 153.81 LBS

## 2019-12-13 DIAGNOSIS — Z00.00 ROUTINE HISTORY AND PHYSICAL EXAMINATION OF ADULT: Primary | ICD-10-CM

## 2019-12-13 DIAGNOSIS — Z13.1 DIABETES MELLITUS SCREENING: ICD-10-CM

## 2019-12-13 DIAGNOSIS — M06.00 SERONEGATIVE RHEUMATOID ARTHRITIS (HCC): ICD-10-CM

## 2019-12-13 DIAGNOSIS — E55.9 VITAMIN D DEFICIENCY: ICD-10-CM

## 2019-12-13 DIAGNOSIS — G89.29 CHRONIC PAIN OF LEFT KNEE: ICD-10-CM

## 2019-12-13 DIAGNOSIS — Z13.220 LIPID SCREENING: ICD-10-CM

## 2019-12-13 DIAGNOSIS — I10 ESSENTIAL HYPERTENSION, BENIGN: ICD-10-CM

## 2019-12-13 DIAGNOSIS — Z13.29 THYROID DISORDER SCREEN: ICD-10-CM

## 2019-12-13 DIAGNOSIS — M06.09 RHEUMATOID ARTHRITIS OF MULTIPLE SITES WITH NEGATIVE RHEUMATOID FACTOR (HCC): ICD-10-CM

## 2019-12-13 DIAGNOSIS — M25.562 CHRONIC PAIN OF LEFT KNEE: ICD-10-CM

## 2019-12-13 PROCEDURE — 99396 PREV VISIT EST AGE 40-64: CPT | Performed by: FAMILY MEDICINE

## 2019-12-13 PROCEDURE — 36415 COLL VENOUS BLD VENIPUNCTURE: CPT | Performed by: FAMILY MEDICINE

## 2019-12-13 PROCEDURE — 83036 HEMOGLOBIN GLYCOSYLATED A1C: CPT | Performed by: FAMILY MEDICINE

## 2019-12-13 PROCEDURE — 82306 VITAMIN D 25 HYDROXY: CPT | Performed by: FAMILY MEDICINE

## 2019-12-13 PROCEDURE — 80061 LIPID PANEL: CPT | Performed by: FAMILY MEDICINE

## 2019-12-13 PROCEDURE — 83735 ASSAY OF MAGNESIUM: CPT | Performed by: FAMILY MEDICINE

## 2019-12-13 PROCEDURE — 84443 ASSAY THYROID STIM HORMONE: CPT | Performed by: FAMILY MEDICINE

## 2019-12-13 NOTE — PROGRESS NOTES
HPI:   Mati Burt is a 62year old female who presents for a complete physical exam.      Patient complains of nothing new or major for me. Did PHYSICAL THERAPY for L knee this year, continues to see Dr. Nafisa Joshua for the RA.     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 • EPINEPHrine (EPIPEN 2-JASVIR) 0.3 MG/0.3ML Injection Device Inject  as directed. • Misc Natural Products (GLUCOSAMINE CHOND COMPLEX/MSM OR) Take  by mouth.      • Fish Oil-Cholecalciferol (FIS pain, no new or unusual joint pains or swelling  NEURO: denies headaches, no syncope or near syncope  PSYCHE: denies depression or anxiety  HEMATOLOGIC: no bruising or noted lymph nodes    EXAM:   /74   Pulse 76   Temp 97.6 °F (36.4 °C) (Temporal) Dr. Fabian Hernandez; gets her annual eye exams  -     CBC WITH DIFFERENTIAL WITH PLATELET  -     COMP METABOLIC PANEL (14)  -     CBC W/ DIFFERENTIAL    Chronic pain of left knee-improved this year with PT  -     CBC WITH DIFFERENTIAL WITH PLATELET  -     COMP METABO

## 2020-01-08 ENCOUNTER — OFFICE VISIT (OUTPATIENT)
Dept: SPORTS MEDICINE | Age: 60
End: 2020-01-08

## 2020-01-08 ENCOUNTER — IMAGING SERVICES (OUTPATIENT)
Dept: GENERAL RADIOLOGY | Age: 60
End: 2020-01-08
Attending: PEDIATRICS

## 2020-01-08 VITALS
SYSTOLIC BLOOD PRESSURE: 108 MMHG | RESPIRATION RATE: 16 BRPM | DIASTOLIC BLOOD PRESSURE: 66 MMHG | HEART RATE: 72 BPM | HEIGHT: 64 IN | WEIGHT: 150 LBS | BODY MASS INDEX: 25.61 KG/M2

## 2020-01-08 DIAGNOSIS — M25.561 ACUTE PAIN OF RIGHT KNEE: ICD-10-CM

## 2020-01-08 DIAGNOSIS — M17.11 OSTEOARTHRITIS OF RIGHT PATELLOFEMORAL JOINT: Primary | ICD-10-CM

## 2020-01-08 PROCEDURE — 73565 X-RAY EXAM OF KNEES: CPT | Performed by: RADIOLOGY

## 2020-01-08 PROCEDURE — 20611 DRAIN/INJ JOINT/BURSA W/US: CPT | Performed by: PEDIATRICS

## 2020-01-08 PROCEDURE — 73562 X-RAY EXAM OF KNEE 3: CPT | Performed by: RADIOLOGY

## 2020-01-08 PROCEDURE — 99214 OFFICE O/P EST MOD 30 MIN: CPT | Performed by: PEDIATRICS

## 2020-01-08 RX ADMIN — TRIAMCINOLONE ACETONIDE 80 MG: 40 INJECTION, SUSPENSION INTRA-ARTICULAR; INTRAMUSCULAR at 11:35

## 2020-01-08 RX ADMIN — LIDOCAINE HYDROCHLORIDE 2 ML: 10 INJECTION, SOLUTION INFILTRATION; PERINEURAL at 11:35

## 2020-01-08 RX ADMIN — BUPIVACAINE HYDROCHLORIDE 2 ML: 5 INJECTION, SOLUTION PERINEURAL at 11:35

## 2020-01-09 RX ORDER — TRIAMCINOLONE ACETONIDE 40 MG/ML
80 INJECTION, SUSPENSION INTRA-ARTICULAR; INTRAMUSCULAR
Status: COMPLETED | OUTPATIENT
Start: 2020-01-08 | End: 2020-01-08

## 2020-01-09 RX ORDER — LIDOCAINE HYDROCHLORIDE 10 MG/ML
2 INJECTION, SOLUTION INFILTRATION; PERINEURAL
Status: COMPLETED | OUTPATIENT
Start: 2020-01-08 | End: 2020-01-08

## 2020-01-09 RX ORDER — BUPIVACAINE HYDROCHLORIDE 5 MG/ML
2 INJECTION, SOLUTION PERINEURAL
Status: COMPLETED | OUTPATIENT
Start: 2020-01-08 | End: 2020-01-08

## 2020-01-28 ENCOUNTER — TELEPHONE (OUTPATIENT)
Dept: FAMILY MEDICINE CLINIC | Facility: CLINIC | Age: 60
End: 2020-01-28

## 2020-03-04 RX ORDER — HYDROCHLOROTHIAZIDE 25 MG/1
TABLET ORAL
Qty: 90 TABLET | Refills: 0 | Status: SHIPPED | OUTPATIENT
Start: 2020-03-04 | End: 2020-06-05

## 2020-03-04 RX ORDER — LOSARTAN POTASSIUM 100 MG/1
TABLET ORAL
Qty: 90 TABLET | Refills: 0 | Status: SHIPPED | OUTPATIENT
Start: 2020-03-04 | End: 2020-06-05

## 2020-03-04 NOTE — TELEPHONE ENCOUNTER
Last refill on HCTZ # 90 with 0 refill on 12 2 2019   Last refill on Losartan #90 with 0 refills on 12 2 2019  Last OV on 12 13 2019  Refilled per protocol

## 2020-03-05 ENCOUNTER — TELEPHONE (OUTPATIENT)
Dept: FAMILY MEDICINE CLINIC | Facility: CLINIC | Age: 60
End: 2020-03-05

## 2020-03-05 DIAGNOSIS — R92.2 DENSE BREASTS: ICD-10-CM

## 2020-03-05 DIAGNOSIS — Z12.31 SCREENING MAMMOGRAM, ENCOUNTER FOR: Primary | ICD-10-CM

## 2020-03-05 NOTE — TELEPHONE ENCOUNTER
PT REQUESTING MAMMOGRAM VIA [facility].  PLEASE PLACE ORDER AND WILL SCHEDULE MAMMOGRAM APT      THANK YOU

## 2020-03-18 ENCOUNTER — NURSE ONLY (OUTPATIENT)
Dept: FAMILY MEDICINE CLINIC | Facility: CLINIC | Age: 60
End: 2020-03-18
Payer: COMMERCIAL

## 2020-03-18 DIAGNOSIS — M06.09 RHEUMATOID ARTHRITIS OF MULTIPLE SITES WITH NEGATIVE RHEUMATOID FACTOR (HCC): ICD-10-CM

## 2020-03-18 LAB
BASOPHILS # BLD AUTO: 0.07 X10(3) UL (ref 0–0.2)
BASOPHILS NFR BLD AUTO: 1 %
DEPRECATED RDW RBC AUTO: 45.9 FL (ref 35.1–46.3)
EOSINOPHIL # BLD AUTO: 0.09 X10(3) UL (ref 0–0.7)
EOSINOPHIL NFR BLD AUTO: 1.3 %
ERYTHROCYTE [DISTWIDTH] IN BLOOD BY AUTOMATED COUNT: 13 % (ref 11–15)
HCT VFR BLD AUTO: 41.1 % (ref 35–48)
HGB BLD-MCNC: 13.4 G/DL (ref 12–16)
IMM GRANULOCYTES # BLD AUTO: 0.01 X10(3) UL (ref 0–1)
IMM GRANULOCYTES NFR BLD: 0.1 %
LYMPHOCYTES # BLD AUTO: 2.54 X10(3) UL (ref 1–4)
LYMPHOCYTES NFR BLD AUTO: 36.2 %
MCH RBC QN AUTO: 31.4 PG (ref 26–34)
MCHC RBC AUTO-ENTMCNC: 32.6 G/DL (ref 31–37)
MCV RBC AUTO: 96.3 FL (ref 80–100)
MONOCYTES # BLD AUTO: 0.68 X10(3) UL (ref 0.1–1)
MONOCYTES NFR BLD AUTO: 9.7 %
NEUTROPHILS # BLD AUTO: 3.62 X10 (3) UL (ref 1.5–7.7)
NEUTROPHILS # BLD AUTO: 3.62 X10(3) UL (ref 1.5–7.7)
NEUTROPHILS NFR BLD AUTO: 51.7 %
PLATELET # BLD AUTO: 207 10(3)UL (ref 150–450)
RBC # BLD AUTO: 4.27 X10(6)UL (ref 3.8–5.3)
SED RATE-ML: 6 MM/HR (ref 0–25)
WBC # BLD AUTO: 7 X10(3) UL (ref 4–11)

## 2020-03-18 PROCEDURE — 85025 COMPLETE CBC W/AUTO DIFF WBC: CPT | Performed by: INTERNAL MEDICINE

## 2020-03-18 PROCEDURE — 86140 C-REACTIVE PROTEIN: CPT | Performed by: INTERNAL MEDICINE

## 2020-03-18 PROCEDURE — 85652 RBC SED RATE AUTOMATED: CPT | Performed by: INTERNAL MEDICINE

## 2020-03-18 PROCEDURE — 82565 ASSAY OF CREATININE: CPT | Performed by: INTERNAL MEDICINE

## 2020-03-18 PROCEDURE — 80076 HEPATIC FUNCTION PANEL: CPT | Performed by: INTERNAL MEDICINE

## 2020-03-18 NOTE — PROGRESS NOTES
Mint and lav tubes drawn from right arm with 21g butterfly needle x1. Pt luis antonio well.  Pt left the clinic in stable condition

## 2020-03-19 LAB
ALBUMIN SERPL-MCNC: 4.3 G/DL (ref 3.4–5)
ALP LIVER SERPL-CCNC: 55 U/L (ref 50–130)
ALT SERPL-CCNC: 30 U/L (ref 13–56)
AST SERPL-CCNC: 24 U/L (ref 15–37)
BILIRUB DIRECT SERPL-MCNC: 0.1 MG/DL (ref 0–0.2)
BILIRUB SERPL-MCNC: 0.4 MG/DL (ref 0.1–2)
CREAT BLD-MCNC: 0.76 MG/DL (ref 0.55–1.02)
CRP SERPL-MCNC: <0.29 MG/DL (ref ?–0.3)
M PROTEIN MFR SERPL ELPH: 7.9 G/DL (ref 6.4–8.2)

## 2020-03-30 RX ORDER — SPIRONOLACTONE 50 MG/1
TABLET, FILM COATED ORAL
Qty: 90 TABLET | Refills: 0 | Status: SHIPPED | OUTPATIENT
Start: 2020-03-30 | End: 2020-07-02

## 2020-03-30 NOTE — TELEPHONE ENCOUNTER
Hypertension Medications Protocol Passed3/30 3:39 AM   CMP or BMP in past 12 months    Last serum creatinine< 2.0    Appointment in past 6 or next 3 months     Last OV 12/31/19  Last labs 12/13/19  CMP/Creat 0.76  Last refilled 10/1/19  #90  1 refill    Re

## 2020-05-13 ENCOUNTER — HOSPITAL ENCOUNTER (OUTPATIENT)
Dept: MAMMOGRAPHY | Facility: HOSPITAL | Age: 60
Discharge: HOME | End: 2020-05-13
Attending: FAMILY MEDICINE
Payer: COMMERCIAL

## 2020-05-13 DIAGNOSIS — Z12.31 SCREENING MAMMOGRAM, ENCOUNTER FOR: ICD-10-CM

## 2020-05-13 DIAGNOSIS — R92.2 DENSE BREASTS: ICD-10-CM

## 2020-05-13 PROCEDURE — 77067 SCR MAMMO BI INCL CAD: CPT | Performed by: FAMILY MEDICINE

## 2020-05-13 PROCEDURE — 77063 BREAST TOMOSYNTHESIS BI: CPT | Performed by: FAMILY MEDICINE

## 2020-06-05 RX ORDER — HYDROCHLOROTHIAZIDE 25 MG/1
TABLET ORAL
Qty: 90 TABLET | Refills: 1 | Status: SHIPPED | OUTPATIENT
Start: 2020-06-05 | End: 2020-11-30

## 2020-06-05 RX ORDER — LOSARTAN POTASSIUM 100 MG/1
TABLET ORAL
Qty: 90 TABLET | Refills: 1 | Status: SHIPPED | OUTPATIENT
Start: 2020-06-05 | End: 2020-11-30

## 2020-07-02 RX ORDER — SPIRONOLACTONE 50 MG/1
TABLET, FILM COATED ORAL
Qty: 90 TABLET | Refills: 0 | Status: SHIPPED | OUTPATIENT
Start: 2020-07-02 | End: 2020-09-28

## 2020-07-02 NOTE — TELEPHONE ENCOUNTER
Hypertension Medications Protocol Failed7/2 3:39 AM   Appointment in past 6 or next 3 months    CMP or BMP in past 12 months    Last serum creatinine< 2.0     Last OV on 12 13 2019  No future appointments scheduled

## 2020-09-23 ENCOUNTER — TELEPHONE (OUTPATIENT)
Dept: FAMILY MEDICINE CLINIC | Facility: CLINIC | Age: 60
End: 2020-09-23

## 2020-09-23 NOTE — TELEPHONE ENCOUNTER
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 Where can she go for a rapid covid test to get the results asap?   Does she need a referral?   Please c

## 2020-09-23 NOTE — TELEPHONE ENCOUNTER
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

## 2020-09-26 NOTE — TELEPHONE ENCOUNTER
Hypertension Medications Protocol Bhnvtt2109/26/2020 03:38 AM   Appointment in past 6 or next 3 months Protocol Details    CMP or BMP in past 12 months     Last serum creatinine< 2.0      Routing to provider per protocol.     Last refilled on 7/2/20 for # 90

## 2020-09-28 RX ORDER — SPIRONOLACTONE 50 MG/1
TABLET, FILM COATED ORAL
Qty: 90 TABLET | Refills: 0 | Status: SHIPPED | OUTPATIENT
Start: 2020-09-28 | End: 2020-12-29

## 2020-11-30 RX ORDER — LOSARTAN POTASSIUM 100 MG/1
TABLET ORAL
Qty: 90 TABLET | Refills: 0 | Status: SHIPPED | OUTPATIENT
Start: 2020-11-30 | End: 2021-02-02

## 2020-11-30 RX ORDER — HYDROCHLOROTHIAZIDE 25 MG/1
TABLET ORAL
Qty: 90 TABLET | Refills: 0 | Status: SHIPPED | OUTPATIENT
Start: 2020-11-30 | End: 2021-02-02

## 2020-11-30 NOTE — TELEPHONE ENCOUNTER
Hypertension Medications Protocol Tksulp2811/29/2020 03:39 AM   Appointment in past 6 or next 3 months Protocol Details    CMP or BMP in past 12 months     Last serum creatinine< 2.0      Routing to provider per protocol.     Both meds last refilled on 6/5/20

## 2020-12-28 NOTE — TELEPHONE ENCOUNTER
Hypertension Medications Protocol [id number]/27/2020 03:38 AM   Appointment in past 6 or next 3 months Protocol Details    CMP or BMP in past 12 months     Last serum creatinine< 2.0      Last refill - 9/28/20 - #90   Last office visit - 12/13/19  Last CMP-

## 2020-12-29 RX ORDER — SPIRONOLACTONE 50 MG/1
TABLET, FILM COATED ORAL
Qty: 90 TABLET | Refills: 0 | Status: SHIPPED | OUTPATIENT
Start: 2020-12-29 | End: 2021-02-02

## 2021-02-02 ENCOUNTER — OFFICE VISIT (OUTPATIENT)
Dept: FAMILY MEDICINE CLINIC | Facility: CLINIC | Age: 61
End: 2021-02-02
Payer: COMMERCIAL

## 2021-02-02 VITALS
DIASTOLIC BLOOD PRESSURE: 86 MMHG | SYSTOLIC BLOOD PRESSURE: 138 MMHG | TEMPERATURE: 97 F | WEIGHT: 161.38 LBS | OXYGEN SATURATION: 98 % | HEIGHT: 64 IN | HEART RATE: 67 BPM | BODY MASS INDEX: 27.55 KG/M2

## 2021-02-02 DIAGNOSIS — I10 ESSENTIAL HYPERTENSION, BENIGN: ICD-10-CM

## 2021-02-02 DIAGNOSIS — Z13.220 LIPID SCREENING: ICD-10-CM

## 2021-02-02 DIAGNOSIS — E55.9 VITAMIN D DEFICIENCY: ICD-10-CM

## 2021-02-02 DIAGNOSIS — Z12.83 SKIN CANCER SCREENING: ICD-10-CM

## 2021-02-02 DIAGNOSIS — Z12.31 SCREENING MAMMOGRAM, ENCOUNTER FOR: ICD-10-CM

## 2021-02-02 DIAGNOSIS — Z12.4 CERVICAL CANCER SCREENING: ICD-10-CM

## 2021-02-02 DIAGNOSIS — Z12.39 SCREENING BREAST EXAMINATION: ICD-10-CM

## 2021-02-02 DIAGNOSIS — M06.09 RHEUMATOID ARTHRITIS OF MULTIPLE SITES WITH NEGATIVE RHEUMATOID FACTOR (HCC): ICD-10-CM

## 2021-02-02 DIAGNOSIS — Z13.1 DIABETES MELLITUS SCREENING: ICD-10-CM

## 2021-02-02 DIAGNOSIS — Z13.29 THYROID DISORDER SCREEN: ICD-10-CM

## 2021-02-02 DIAGNOSIS — Z00.00 ROUTINE HISTORY AND PHYSICAL EXAMINATION OF ADULT: Primary | ICD-10-CM

## 2021-02-02 DIAGNOSIS — M06.00 SERONEGATIVE RHEUMATOID ARTHRITIS (HCC): ICD-10-CM

## 2021-02-02 DIAGNOSIS — Z13.0 SCREENING, ANEMIA, DEFICIENCY, IRON: ICD-10-CM

## 2021-02-02 LAB
ALBUMIN SERPL-MCNC: 4.5 G/DL (ref 3.4–5)
ALBUMIN/GLOB SERPL: 1.3 {RATIO} (ref 1–2)
ALP LIVER SERPL-CCNC: 59 U/L
ALT SERPL-CCNC: 41 U/L
ANION GAP SERPL CALC-SCNC: 6 MMOL/L (ref 0–18)
AST SERPL-CCNC: 36 U/L (ref 15–37)
BASOPHILS # BLD AUTO: 0.09 X10(3) UL (ref 0–0.2)
BASOPHILS NFR BLD AUTO: 1.5 %
BILIRUB SERPL-MCNC: 0.4 MG/DL (ref 0.1–2)
BUN BLD-MCNC: 21 MG/DL (ref 7–18)
BUN/CREAT SERPL: 30.9 (ref 10–20)
CALCIUM BLD-MCNC: 9.8 MG/DL (ref 8.5–10.1)
CHLORIDE SERPL-SCNC: 100 MMOL/L (ref 98–112)
CHOLEST SMN-MCNC: 250 MG/DL (ref ?–200)
CO2 SERPL-SCNC: 29 MMOL/L (ref 21–32)
CREAT BLD-MCNC: 0.68 MG/DL
CRP SERPL-MCNC: <0.29 MG/DL (ref ?–0.3)
DEPRECATED RDW RBC AUTO: 44.6 FL (ref 35.1–46.3)
EOSINOPHIL # BLD AUTO: 0.12 X10(3) UL (ref 0–0.7)
EOSINOPHIL NFR BLD AUTO: 2 %
ERYTHROCYTE [DISTWIDTH] IN BLOOD BY AUTOMATED COUNT: 12.9 % (ref 11–15)
GLOBULIN PLAS-MCNC: 3.4 G/DL (ref 2.8–4.4)
GLUCOSE BLD-MCNC: 83 MG/DL (ref 70–99)
HCT VFR BLD AUTO: 39.2 %
HDLC SERPL-MCNC: 115 MG/DL (ref 40–59)
HGB BLD-MCNC: 13.2 G/DL
IMM GRANULOCYTES # BLD AUTO: 0.02 X10(3) UL (ref 0–1)
IMM GRANULOCYTES NFR BLD: 0.3 %
LDLC SERPL CALC-MCNC: 122 MG/DL (ref ?–100)
LYMPHOCYTES # BLD AUTO: 2.39 X10(3) UL (ref 1–4)
LYMPHOCYTES NFR BLD AUTO: 40 %
M PROTEIN MFR SERPL ELPH: 7.9 G/DL (ref 6.4–8.2)
MCH RBC QN AUTO: 31.7 PG (ref 26–34)
MCHC RBC AUTO-ENTMCNC: 33.7 G/DL (ref 31–37)
MCV RBC AUTO: 94 FL
MONOCYTES # BLD AUTO: 0.57 X10(3) UL (ref 0.1–1)
MONOCYTES NFR BLD AUTO: 9.5 %
NEUTROPHILS # BLD AUTO: 2.78 X10 (3) UL (ref 1.5–7.7)
NEUTROPHILS # BLD AUTO: 2.78 X10(3) UL (ref 1.5–7.7)
NEUTROPHILS NFR BLD AUTO: 46.7 %
NONHDLC SERPL-MCNC: 135 MG/DL (ref ?–130)
OSMOLALITY SERPL CALC.SUM OF ELEC: 282 MOSM/KG (ref 275–295)
PATIENT FASTING Y/N/NP: NO
PATIENT FASTING Y/N/NP: NO
PLATELET # BLD AUTO: 244 10(3)UL (ref 150–450)
POTASSIUM SERPL-SCNC: 4.6 MMOL/L (ref 3.5–5.1)
RBC # BLD AUTO: 4.17 X10(6)UL
SED RATE-ML: 8 MM/HR
SODIUM SERPL-SCNC: 135 MMOL/L (ref 136–145)
TRIGL SERPL-MCNC: 63 MG/DL (ref 30–149)
TSI SER-ACNC: 3.54 MIU/ML (ref 0.36–3.74)
VIT D+METAB SERPL-MCNC: 28 NG/ML (ref 30–100)
VLDLC SERPL CALC-MCNC: 13 MG/DL (ref 0–30)
WBC # BLD AUTO: 6 X10(3) UL (ref 4–11)

## 2021-02-02 PROCEDURE — 84443 ASSAY THYROID STIM HORMONE: CPT | Performed by: FAMILY MEDICINE

## 2021-02-02 PROCEDURE — 3075F SYST BP GE 130 - 139MM HG: CPT | Performed by: FAMILY MEDICINE

## 2021-02-02 PROCEDURE — 36415 COLL VENOUS BLD VENIPUNCTURE: CPT | Performed by: FAMILY MEDICINE

## 2021-02-02 PROCEDURE — 88175 CYTOPATH C/V AUTO FLUID REDO: CPT | Performed by: FAMILY MEDICINE

## 2021-02-02 PROCEDURE — 80061 LIPID PANEL: CPT | Performed by: FAMILY MEDICINE

## 2021-02-02 PROCEDURE — 99396 PREV VISIT EST AGE 40-64: CPT | Performed by: FAMILY MEDICINE

## 2021-02-02 PROCEDURE — 87624 HPV HI-RISK TYP POOLED RSLT: CPT | Performed by: FAMILY MEDICINE

## 2021-02-02 PROCEDURE — 3079F DIAST BP 80-89 MM HG: CPT | Performed by: FAMILY MEDICINE

## 2021-02-02 PROCEDURE — 82306 VITAMIN D 25 HYDROXY: CPT | Performed by: FAMILY MEDICINE

## 2021-02-02 PROCEDURE — 83036 HEMOGLOBIN GLYCOSYLATED A1C: CPT | Performed by: FAMILY MEDICINE

## 2021-02-02 PROCEDURE — 3008F BODY MASS INDEX DOCD: CPT | Performed by: FAMILY MEDICINE

## 2021-02-02 RX ORDER — HYDROCHLOROTHIAZIDE 25 MG/1
25 TABLET ORAL DAILY
Qty: 90 TABLET | Refills: 3 | Status: SHIPPED | OUTPATIENT
Start: 2021-02-02

## 2021-02-02 RX ORDER — LOSARTAN POTASSIUM 100 MG/1
100 TABLET ORAL DAILY
Qty: 90 TABLET | Refills: 3 | Status: SHIPPED | OUTPATIENT
Start: 2021-02-02

## 2021-02-02 RX ORDER — EPINEPHRINE 0.3 MG/.3ML
0.3 INJECTION SUBCUTANEOUS ONCE
Qty: 1 EACH | Refills: 0 | Status: SHIPPED | OUTPATIENT
Start: 2021-02-02 | End: 2021-02-02

## 2021-02-02 RX ORDER — SPIRONOLACTONE 50 MG/1
50 TABLET, FILM COATED ORAL DAILY
Qty: 90 TABLET | Refills: 3 | Status: SHIPPED | OUTPATIENT
Start: 2021-02-02

## 2021-02-02 NOTE — PROGRESS NOTES
HPI:   Caroline Mazariegos is a 56year old female who presents for a complete physical exam.      Patient complains of nothing major. Has a few skin changes she'd like checked out.   Would like derm referral.  Continues to follow with Dr. Josh de paz, going well (50 mg total) by mouth daily. 90 tablet 3   • EPINEPHrine (EPIPEN 2-JASVIR) 0.3 MG/0.3ML Injection Solution Auto-injector Inject 0.3 mL (1 each total) as directed one time for 1 dose.  1 each 0   • HYDROXYCHLOROQUINE SULFATE 200 MG Oral Tab TAKE TWO TABLETS BY chronic cough  CARDIOVASCULAR: denies chest pain on exertion, no heart palps, no edema  GI: denies abdominal pain,denies heartburn, no nausea, no changes in BMs, no blood in stool  : denies dysuria, vaginal discharge or itching, periods absent  MUSCULOSK eating. Limiting animal products, but when consuming choosing lean cuts, limiting or avoiding processed meats and severely limiting or avoiding sugar added beverages and processed/junk foods.    Healthy exercise habits include 150min of low to moderate inte VITAMIN D, 25-HYDROXY; Future    Vitamin D deficiency  -     VENIPUNCTURE  -     VITAMIN D, 25-HYDROXY; Future    Screening mammogram, encounter for  -     Cancel: [facility] TRACEY 2D+3D SCREENING BILAT (CPT=77067/30017);  Future  -     [facility] TRACEY 2D+3D SCREENING EUSEBIO

## 2021-02-03 LAB
EST. AVERAGE GLUCOSE BLD GHB EST-MCNC: 108 MG/DL (ref 68–126)
HBA1C MFR BLD HPLC: 5.4 % (ref ?–5.7)

## 2021-02-04 LAB — HPV I/H RISK 1 DNA SPEC QL NAA+PROBE: NEGATIVE

## 2021-02-08 LAB
LAST PAP RESULT: NORMAL
PAP HISTORY (OTHER THAN LAST PAP): NORMAL

## 2021-05-24 NOTE — PROGRESS NOTES
Left message for the pt with the instructions from Dr. Seven Cabrera- advised to call if questions 18

## 2021-06-02 ENCOUNTER — OFFICE VISIT (OUTPATIENT)
Dept: SPORTS MEDICINE | Age: 61
End: 2021-06-02

## 2021-06-02 VITALS
HEART RATE: 66 BPM | RESPIRATION RATE: 17 BRPM | HEIGHT: 64 IN | SYSTOLIC BLOOD PRESSURE: 118 MMHG | BODY MASS INDEX: 26.46 KG/M2 | WEIGHT: 155 LBS | DIASTOLIC BLOOD PRESSURE: 70 MMHG

## 2021-06-02 DIAGNOSIS — M17.11 PRIMARY OSTEOARTHRITIS OF RIGHT KNEE: Primary | ICD-10-CM

## 2021-06-02 DIAGNOSIS — M17.0 PRIMARY OSTEOARTHRITIS OF BOTH KNEES: ICD-10-CM

## 2021-06-02 PROCEDURE — 99214 OFFICE O/P EST MOD 30 MIN: CPT | Performed by: PEDIATRICS

## 2021-06-02 PROCEDURE — 20611 DRAIN/INJ JOINT/BURSA W/US: CPT | Performed by: PEDIATRICS

## 2021-06-02 RX ORDER — LIDOCAINE HYDROCHLORIDE 10 MG/ML
2 INJECTION, SOLUTION INFILTRATION; PERINEURAL
Status: COMPLETED | OUTPATIENT
Start: 2021-06-02 | End: 2021-06-02

## 2021-06-02 RX ORDER — BUPIVACAINE HYDROCHLORIDE 5 MG/ML
2 INJECTION, SOLUTION PERINEURAL
Status: COMPLETED | OUTPATIENT
Start: 2021-06-02 | End: 2021-06-02

## 2021-06-02 RX ORDER — TRIAMCINOLONE ACETONIDE 40 MG/ML
80 INJECTION, SUSPENSION INTRA-ARTICULAR; INTRAMUSCULAR
Status: COMPLETED | OUTPATIENT
Start: 2021-06-02 | End: 2021-06-02

## 2021-06-02 RX ADMIN — BUPIVACAINE HYDROCHLORIDE 2 ML: 5 INJECTION, SOLUTION PERINEURAL at 08:17

## 2021-06-02 RX ADMIN — TRIAMCINOLONE ACETONIDE 80 MG: 40 INJECTION, SUSPENSION INTRA-ARTICULAR; INTRAMUSCULAR at 08:17

## 2021-06-02 RX ADMIN — LIDOCAINE HYDROCHLORIDE 2 ML: 10 INJECTION, SOLUTION INFILTRATION; PERINEURAL at 08:17

## 2021-06-03 ENCOUNTER — HOSPITAL ENCOUNTER (OUTPATIENT)
Dept: MAMMOGRAPHY | Facility: HOSPITAL | Age: 61
Discharge: HOME | End: 2021-06-03
Attending: FAMILY MEDICINE
Payer: COMMERCIAL

## 2021-06-03 DIAGNOSIS — Z00.00 ROUTINE HISTORY AND PHYSICAL EXAMINATION OF ADULT: ICD-10-CM

## 2021-06-03 DIAGNOSIS — Z12.31 SCREENING MAMMOGRAM, ENCOUNTER FOR: ICD-10-CM

## 2021-06-03 PROCEDURE — 77067 SCR MAMMO BI INCL CAD: CPT | Performed by: FAMILY MEDICINE

## 2021-06-03 PROCEDURE — 77063 BREAST TOMOSYNTHESIS BI: CPT | Performed by: FAMILY MEDICINE

## 2021-06-10 ENCOUNTER — EXTERNAL RECORD (OUTPATIENT)
Dept: HEALTH INFORMATION MANAGEMENT | Facility: OTHER | Age: 61
End: 2021-06-10

## 2021-06-16 ENCOUNTER — APPOINTMENT (OUTPATIENT)
Dept: SPORTS MEDICINE | Age: 61
End: 2021-06-16

## 2021-06-22 ENCOUNTER — PATIENT MESSAGE (OUTPATIENT)
Dept: FAMILY MEDICINE CLINIC | Facility: CLINIC | Age: 61
End: 2021-06-22

## 2021-06-22 NOTE — TELEPHONE ENCOUNTER
From: Catarina Villagomez  To: Maurizio Combs MD  Sent: 6/22/2021 9:07 AM CDT  Subject: Non-Urgent Medical Question    I have received my COVID vaccines. I received your first Moderna vaccine on 2-10-21 and my second on 3-10-21.

## 2021-07-09 ENCOUNTER — OFFICE VISIT (OUTPATIENT)
Dept: SPORTS MEDICINE | Age: 61
End: 2021-07-09

## 2021-07-09 VITALS
HEART RATE: 80 BPM | HEIGHT: 64 IN | WEIGHT: 155 LBS | BODY MASS INDEX: 26.46 KG/M2 | DIASTOLIC BLOOD PRESSURE: 78 MMHG | SYSTOLIC BLOOD PRESSURE: 130 MMHG

## 2021-07-09 DIAGNOSIS — M17.11 PRIMARY OSTEOARTHRITIS OF RIGHT KNEE: Primary | ICD-10-CM

## 2021-07-09 PROCEDURE — 99214 OFFICE O/P EST MOD 30 MIN: CPT | Performed by: PEDIATRICS

## 2021-07-09 RX ORDER — MELOXICAM 7.5 MG/1
7.5 TABLET ORAL DAILY
Qty: 14 TABLET | Refills: 0 | Status: SHIPPED | OUTPATIENT
Start: 2021-07-09 | End: 2021-07-23

## 2021-07-09 RX ORDER — LOSARTAN POTASSIUM 100 MG/1
100 TABLET ORAL
COMMUNITY
Start: 2021-02-02

## 2021-07-09 RX ORDER — HYDROCHLOROTHIAZIDE 25 MG/1
TABLET ORAL
COMMUNITY
Start: 2021-05-23

## 2021-07-09 RX ORDER — SULFASALAZINE 500 MG/1
TABLET ORAL
COMMUNITY
Start: 2021-03-16

## 2021-07-09 RX ORDER — SPIRONOLACTONE 50 MG/1
50 TABLET, FILM COATED ORAL
COMMUNITY
Start: 2021-02-02

## 2022-02-09 ENCOUNTER — TELEPHONE (OUTPATIENT)
Dept: FAMILY MEDICINE CLINIC | Facility: CLINIC | Age: 62
End: 2022-02-09

## 2022-02-09 PROCEDURE — 81003 URINALYSIS AUTO W/O SCOPE: CPT | Performed by: FAMILY MEDICINE

## 2022-02-09 NOTE — TELEPHONE ENCOUNTER
Symptoms started late yesterday - urine cloudy  Little drops of blood in am  Urgency   Goes, but does not completely empty bladder  \"burning\" \"uncomfortable\"     Did have symptoms last week for 1 day - but drank more water, syx resolved    Need urine sample - urine dip ordered, urine culture if indicated  Nurse visit appt made    Future Appointments   Date Time Provider Haroon Collins   2/10/2022 10:00 AM  [facility] [place] [place]   3/14/2022  3:00 PM Sanaz Turner MD [facility] [place]

## 2022-02-10 ENCOUNTER — NURSE ONLY (OUTPATIENT)
Dept: FAMILY MEDICINE CLINIC | Facility: CLINIC | Age: 62
End: 2022-02-10
Payer: COMMERCIAL

## 2022-02-10 ENCOUNTER — TELEPHONE (OUTPATIENT)
Dept: FAMILY MEDICINE CLINIC | Facility: CLINIC | Age: 62
End: 2022-02-10

## 2022-02-10 DIAGNOSIS — R82.998 LEUKOCYTES IN URINE: Primary | ICD-10-CM

## 2022-02-10 DIAGNOSIS — N39.0 URINARY TRACT INFECTION WITHOUT HEMATURIA, SITE UNSPECIFIED: ICD-10-CM

## 2022-02-10 LAB
APPEARANCE: CLEAR
BILIRUBIN: NEGATIVE
GLUCOSE (URINE DIPSTICK): NEGATIVE MG/DL
KETONES (URINE DIPSTICK): NEGATIVE MG/DL
MULTISTIX LOT#: ABNORMAL NUMERIC
OCCULT BLOOD: NEGATIVE
PH, URINE: 7 (ref 4.5–8)
PROTEIN (URINE DIPSTICK): NEGATIVE MG/DL
SPECIFIC GRAVITY: 1.02 (ref 1–1.03)
URINE-COLOR: YELLOW
UROBILINOGEN,SEMI-QN: 0.2 MG/DL (ref 0–1.9)

## 2022-02-10 PROCEDURE — 87086 URINE CULTURE/COLONY COUNT: CPT | Performed by: FAMILY MEDICINE

## 2022-02-10 RX ORDER — NITROFURANTOIN 25; 75 MG/1; MG/1
100 CAPSULE ORAL 2 TIMES DAILY
Qty: 10 CAPSULE | Refills: 0 | Status: SHIPPED | OUTPATIENT
Start: 2022-02-10 | End: 2022-02-15

## 2022-02-10 NOTE — TELEPHONE ENCOUNTER
----- Message from Sanaz Ramos MD sent at 2/10/2022 10:26 AM CST -----  Please send Urine Cx. Send Rx Macrobid 100 mg twice daily X 5 days. Thank you.

## 2022-02-10 NOTE — TELEPHONE ENCOUNTER
Pt called back confirmed pharmacy     [address] [address] [place] [phone], [phone]    Thank you

## 2022-02-10 NOTE — PROGRESS NOTES
Omega Rose present in office for nurse visit. Urine sample obtained   Urine dip completed - urine culture indicated - ordered    All questions/concerns addressed. Patient left in stable condition.

## 2022-02-10 NOTE — TELEPHONE ENCOUNTER
Left detailed message to voicemail (per verbal release form consent with confirmed identifying message) regarding Doctor's note below.  Patient advised to call office back - need to confirm which pharmacy to send Rx to

## 2022-02-11 ENCOUNTER — TELEPHONE (OUTPATIENT)
Dept: FAMILY MEDICINE CLINIC | Facility: CLINIC | Age: 62
End: 2022-02-11

## 2022-02-11 NOTE — TELEPHONE ENCOUNTER
----- Message from Sanaz Ferrer MD sent at 2/11/2022  3:47 PM CST -----  Considering Urine Cx shows no growth - she may stop taking the antibiotic at this time.  Hydrate well and follow up if symptoms do not resolve. ~ MM

## 2022-02-17 RX ORDER — SPIRONOLACTONE 50 MG/1
50 TABLET, FILM COATED ORAL DAILY
Qty: 90 TABLET | Refills: 3 | Status: SHIPPED | OUTPATIENT
Start: 2022-02-17

## 2022-02-17 RX ORDER — LOSARTAN POTASSIUM 100 MG/1
100 TABLET ORAL DAILY
Qty: 90 TABLET | Refills: 3 | Status: SHIPPED | OUTPATIENT
Start: 2022-02-17

## 2022-02-17 NOTE — TELEPHONE ENCOUNTER
Hypertension Medications Protocol Passed 02/17/2022 03:03 PM   Protocol Details  CMP or BMP in past 12 months    Last serum creatinine< 2.0    Appointment in past 6 or next 3 months        Refilled per protocol  spironolactone 50 MG Oral Tab  Last refilled on 2/2/21 #90 with 3 rf. Hypertension Medications Protocol Passed 02/17/2022 03:06 PM   Protocol Details  CMP or BMP in past 12 months    Last serum creatinine< 2.0    Appointment in past 6 or next 3 months        losartan 100 MG Oral Tab  Last refilled on 2/2/21 #90 with 3 rf.     LOV- 2/2/21  Last labs- 2/10/22    Sent to pharmacy

## 2022-02-21 RX ORDER — HYDROCHLOROTHIAZIDE 25 MG/1
25 TABLET ORAL DAILY
Qty: 90 TABLET | Refills: 3 | Status: SHIPPED | OUTPATIENT
Start: 2022-02-21

## 2022-03-14 ENCOUNTER — PATIENT MESSAGE (OUTPATIENT)
Dept: FAMILY MEDICINE CLINIC | Facility: CLINIC | Age: 62
End: 2022-03-14

## 2022-03-14 ENCOUNTER — OFFICE VISIT (OUTPATIENT)
Dept: FAMILY MEDICINE CLINIC | Facility: CLINIC | Age: 62
End: 2022-03-14
Payer: COMMERCIAL

## 2022-03-14 VITALS
SYSTOLIC BLOOD PRESSURE: 128 MMHG | BODY MASS INDEX: 27.52 KG/M2 | DIASTOLIC BLOOD PRESSURE: 86 MMHG | WEIGHT: 157.25 LBS | HEIGHT: 63.5 IN | OXYGEN SATURATION: 97 % | RESPIRATION RATE: 16 BRPM | HEART RATE: 74 BPM | TEMPERATURE: 97 F

## 2022-03-14 DIAGNOSIS — E78.49 OTHER HYPERLIPIDEMIA: ICD-10-CM

## 2022-03-14 DIAGNOSIS — Z12.31 SCREENING MAMMOGRAM FOR BREAST CANCER: ICD-10-CM

## 2022-03-14 DIAGNOSIS — D22.9 MULTIPLE NEVI: ICD-10-CM

## 2022-03-14 DIAGNOSIS — I10 ESSENTIAL HYPERTENSION, BENIGN: ICD-10-CM

## 2022-03-14 DIAGNOSIS — Z00.00 ANNUAL PHYSICAL EXAM: Primary | ICD-10-CM

## 2022-03-14 DIAGNOSIS — Z12.11 SCREENING FOR COLON CANCER: ICD-10-CM

## 2022-03-14 DIAGNOSIS — E55.9 VITAMIN D DEFICIENCY: ICD-10-CM

## 2022-03-14 LAB
CHOLEST SERPL-MCNC: 224 MG/DL (ref ?–200)
FASTING PATIENT LIPID ANSWER: NO
HDLC SERPL-MCNC: 101 MG/DL (ref 40–59)
LDLC SERPL CALC-MCNC: 114 MG/DL (ref ?–100)
NONHDLC SERPL-MCNC: 123 MG/DL (ref ?–130)
TRIGL SERPL-MCNC: 51 MG/DL (ref 30–149)
VIT D+METAB SERPL-MCNC: 32.3 NG/ML (ref 30–100)
VLDLC SERPL CALC-MCNC: 9 MG/DL (ref 0–30)

## 2022-03-14 PROCEDURE — 3008F BODY MASS INDEX DOCD: CPT | Performed by: FAMILY MEDICINE

## 2022-03-14 PROCEDURE — 80061 LIPID PANEL: CPT | Performed by: FAMILY MEDICINE

## 2022-03-14 PROCEDURE — 3074F SYST BP LT 130 MM HG: CPT | Performed by: FAMILY MEDICINE

## 2022-03-14 PROCEDURE — 3079F DIAST BP 80-89 MM HG: CPT | Performed by: FAMILY MEDICINE

## 2022-03-14 PROCEDURE — 99396 PREV VISIT EST AGE 40-64: CPT | Performed by: FAMILY MEDICINE

## 2022-03-14 PROCEDURE — 82306 VITAMIN D 25 HYDROXY: CPT | Performed by: FAMILY MEDICINE

## 2022-03-14 PROCEDURE — 83036 HEMOGLOBIN GLYCOSYLATED A1C: CPT | Performed by: FAMILY MEDICINE

## 2022-03-14 NOTE — TELEPHONE ENCOUNTER
From: Melodie Germain  To: Sanaz Saba MD  Sent: 3/14/2022 5:33 PM CDT  Subject: Colonoscopy     Hello, My last colonoscopy was 10/18/2013

## 2022-03-15 LAB
EST. AVERAGE GLUCOSE BLD GHB EST-MCNC: 105 MG/DL (ref 68–126)
HBA1C MFR BLD: 5.3 % (ref ?–5.7)

## 2022-04-12 ENCOUNTER — TELEPHONE (OUTPATIENT)
Dept: FAMILY MEDICINE CLINIC | Facility: CLINIC | Age: 62
End: 2022-04-12

## 2022-04-12 NOTE — TELEPHONE ENCOUNTER
Pt states she did not take Losartan today due to possibly causing the swelling in lips. Currently her lips are chapped and she is using Aquarphor for the irritation. Her face feels swollen. Did take Benadryl today and is not helping. Denies any issues with breathing or swelling any where else. UC advised her blood pressure meds might be the cause of the swelling and she should follow up with PCP to discuss. Forward to Dr. Faustino Meza, please advise. Prednisone script and follow up on Thurs? Or be seen sooner?

## 2022-04-12 NOTE — TELEPHONE ENCOUNTER
PT CALLED BACK TO ADV GETTING READY TO GO TO WORK, PLEASE CALL CELL PHONE :  [address] [facility]

## 2022-04-12 NOTE — TELEPHONE ENCOUNTER
PT CALLED AND ADV WAS IN [place] ABOUT 1 MONTH AGO. ADV THAT LIPS GOT REALLY CHAPPED. ABOUT 2 WEEKS AGO PT HAD A LIP FLARE UP AND WENT TO AN URGENT CARE AND WAS PRESCRIBED PREDNISONE. ADV THAT IT DID GET BETTER BUT DUE TO THE BP MEDS THAT PT IS ON THAT IT COULD HAPPEN AGAIN. WELL IT HAPPENED AGAIN LIP FLARE UP)  AND WOULD LIKE TO BE SEEN OR TO SEE IF PREDNISONE CAN BE CALLED IN      [place].     PLEASE ADV    THANK YOU

## 2022-04-13 ENCOUNTER — PATIENT MESSAGE (OUTPATIENT)
Dept: FAMILY MEDICINE CLINIC | Facility: CLINIC | Age: 62
End: 2022-04-13

## 2022-04-13 RX ORDER — SULFAMETHOXAZOLE AND TRIMETHOPRIM 800; 160 MG/1; MG/1
1 TABLET ORAL 2 TIMES DAILY
Qty: 14 TABLET | Refills: 0 | Status: SHIPPED | OUTPATIENT
Start: 2022-04-13 | End: 2022-04-20

## 2022-04-13 RX ORDER — PREDNISONE 10 MG/1
TABLET ORAL
Qty: 18 TABLET | Refills: 0 | Status: SHIPPED | OUTPATIENT
Start: 2022-04-13 | End: 2022-04-21

## 2022-04-13 NOTE — TELEPHONE ENCOUNTER
Patient advised of Doctor's note [facility] [place]). Patient verbalized understanding.      Pt asking if she should or should not be taking hydrochlorothiazide  Advised pt will call her/send [facility] if Dr. Reji Silverman wants her to continue tot take - she v/u

## 2022-04-13 NOTE — TELEPHONE ENCOUNTER
Month in [place] - when Came back - lips chapped - using Aquaphor    2 weeks ago - cat jumped on lap - pet cat and touching lips  15 mins later - lips blew up, Lips irritated sensitive    Went to UC - Rx prednisone  Pt was advised by UC - lip swelling Could be from BP meds? was advised to follow-up with PCP - but lips got better after prednisone    Last week - started irritation again  Tuesday morning - lips all swollen - face puffy    Has been taking benadryl - no improvement  Stopped taking losartan 2 weeks ago    Pt called yesterday and was advised lip swelling possibly from hydrochlorothiazide    Today - stopped taking Hydrochlorothiazide  Advised pt to send picture of lips via [facility] v/u.  (please review Patient message 04/13/2022)    Pt looking for recommendations, need to be seen? Or Rx prednisone?     Please call work phone back - [phone] ext [phone]    Pt requesting appt after 2 PM

## 2022-04-13 NOTE — TELEPHONE ENCOUNTER
As of today, sounds like her lip swelling has improved. She has no shortness of breath / tongue swelling at this time. Agree with discontinuing Losartan (this could have caused the swelling)   Follow up OV in the next few days to discuss BP medications/evaluate lip swelling.    In the meanwhile, if any worsening / fever or chills go to the IC

## 2022-04-13 NOTE — TELEPHONE ENCOUNTER
From: Sue Noriega  To: Sanaz Gomez MD  Sent: 4/13/2022 8:59 AM CDT  Subject: Lips    This is the picture

## 2022-04-20 ENCOUNTER — OFFICE VISIT (OUTPATIENT)
Dept: FAMILY MEDICINE CLINIC | Facility: CLINIC | Age: 62
End: 2022-04-20
Payer: COMMERCIAL

## 2022-04-20 VITALS
OXYGEN SATURATION: 98 % | WEIGHT: 159.13 LBS | RESPIRATION RATE: 18 BRPM | TEMPERATURE: 97 F | HEART RATE: 97 BPM | DIASTOLIC BLOOD PRESSURE: 82 MMHG | BODY MASS INDEX: 28 KG/M2 | SYSTOLIC BLOOD PRESSURE: 158 MMHG

## 2022-04-20 DIAGNOSIS — I10 ESSENTIAL HYPERTENSION, BENIGN: ICD-10-CM

## 2022-04-20 DIAGNOSIS — R22.0 SWELLING OF BOTH LIPS: Primary | ICD-10-CM

## 2022-04-20 DIAGNOSIS — K13.0 CRACKED LIPS: ICD-10-CM

## 2022-04-20 PROCEDURE — 99213 OFFICE O/P EST LOW 20 MIN: CPT | Performed by: FAMILY MEDICINE

## 2022-04-20 PROCEDURE — 3079F DIAST BP 80-89 MM HG: CPT | Performed by: FAMILY MEDICINE

## 2022-04-20 PROCEDURE — 3077F SYST BP >= 140 MM HG: CPT | Performed by: FAMILY MEDICINE

## 2022-04-20 RX ORDER — METOPROLOL SUCCINATE 25 MG/1
25 TABLET, EXTENDED RELEASE ORAL DAILY
Qty: 30 TABLET | Refills: 0 | Status: SHIPPED | OUTPATIENT
Start: 2022-04-20 | End: 2022-05-20

## 2022-05-16 RX ORDER — EPINEPHRINE 0.3 MG/.3ML
0.3 INJECTION SUBCUTANEOUS AS NEEDED
Qty: 2 EACH | Refills: 0 | Status: SHIPPED | OUTPATIENT
Start: 2022-05-16

## 2022-05-16 RX ORDER — METOPROLOL SUCCINATE 25 MG/1
25 TABLET, EXTENDED RELEASE ORAL DAILY
Qty: 30 TABLET | Refills: 0 | Status: SHIPPED | OUTPATIENT
Start: 2022-05-16 | End: 2022-06-15

## 2022-05-16 NOTE — TELEPHONE ENCOUNTER
PT CALLED AND ADV HAD ERIN OSWALD W/ DR COX.    PT ADV BP READING /84.     PT NEEDS REFILLS OF :         metoprolol succinate ER 25 MG Oral Tablet 24 Hr    AND    EPINEPHrine 0.3 MG/0.3ML Injection Solution Auto-injector      [facility]      THANK YOU

## 2022-05-16 NOTE — TELEPHONE ENCOUNTER
LOV 04/20/2022  BP Readings from Last 3 Encounters:  04/20/22 : 158/82  03/14/22 : 128/86  11/05/21 : 116/72      Last refill on 04/20/2022, for #30 tabs, with 0 refills  metoprolol succinate ER 25 MG Oral Tablet 24 Hr    Last refill on (Historical), for #?, with ? refills  EPINEPHrine 0.3 MG/0.3ML Injection Solution Auto-injector    No future appointments. Order(s) pending, please review. Thank you.

## 2022-06-10 ENCOUNTER — HOSPITAL ENCOUNTER (OUTPATIENT)
Dept: MAMMOGRAPHY | Facility: HOSPITAL | Age: 62
Discharge: HOME | End: 2022-06-10
Attending: FAMILY MEDICINE
Payer: COMMERCIAL

## 2022-06-10 DIAGNOSIS — Z12.31 SCREENING MAMMOGRAM FOR BREAST CANCER: ICD-10-CM

## 2022-06-10 PROCEDURE — 77067 SCR MAMMO BI INCL CAD: CPT | Performed by: FAMILY MEDICINE

## 2022-06-10 PROCEDURE — 77063 BREAST TOMOSYNTHESIS BI: CPT | Performed by: FAMILY MEDICINE

## 2022-06-13 RX ORDER — METOPROLOL SUCCINATE 25 MG/1
TABLET, EXTENDED RELEASE ORAL
Qty: 30 TABLET | Refills: 0 | Status: SHIPPED | OUTPATIENT
Start: 2022-06-13

## 2022-06-13 NOTE — TELEPHONE ENCOUNTER
Hypertension Medications Protocol Passed 06/13/2022 07:16 AM   Protocol Details  CMP or BMP in past 12 months    Last serum creatinine< 2.0    Appointment in past 6 or next 3 months     Refilled per protocol  metoprolol succinate ER 25 MG Oral Tablet 24 Hr  Last refilled on 5/16/22 #30 with 0 rf.   LOV- 4/20/22  Last labs- 5/9/22    Sent to pharmacy

## 2022-07-13 RX ORDER — METOPROLOL SUCCINATE 25 MG/1
TABLET, EXTENDED RELEASE ORAL
Qty: 30 TABLET | Refills: 0 | Status: SHIPPED | OUTPATIENT
Start: 2022-07-13

## 2022-07-13 NOTE — TELEPHONE ENCOUNTER
Hypertension Medications Protocol Passed 07/13/2022 04:09 PM   Protocol Details  CMP or BMP in past 12 months    Last serum creatinine< 2.0    Appointment in past 6 or next 3 months        Refilled per protocol  METOPROLOL SUCCINATE ER 25 MG Oral Tablet 24 Hr  Last refilled on 6/13/22 #30 with 0 rf.   LOV- 4/20/22  Last labs- 5/9/22    Sent to pharmacy

## 2022-08-03 ENCOUNTER — TELEPHONE (OUTPATIENT)
Dept: FAMILY MEDICINE CLINIC | Facility: CLINIC | Age: 62
End: 2022-08-03

## 2022-08-04 NOTE — TELEPHONE ENCOUNTER
Received fax from [address] regarding pre-op request    Pt w/ future pre-op appt  Future Appointments   Date Time Provider Haroon Collins   8/31/2022  2:40 PM Ed Ahumada, Mydhili, MD [facility] [place]     Orders placed in blue book

## 2022-08-15 RX ORDER — METOPROLOL SUCCINATE 25 MG/1
TABLET, EXTENDED RELEASE ORAL
Qty: 30 TABLET | Refills: 0 | Status: SHIPPED | OUTPATIENT
Start: 2022-08-15

## 2022-08-15 NOTE — TELEPHONE ENCOUNTER
Hypertension Medications Protocol Passed 08/15/2022 07:30 AM   Protocol Details  CMP or BMP in past 12 months    Last serum creatinine< 2.0    Appointment in past 6 or next 3 months     Refilled per protocol  METOPROLOL SUCCINATE ER 25 MG Oral Tablet 24 Hr  Last refilled on 7/13/22 #30 with 0 rf.   LOV- 4/20/22  Last labs- 5/9/22    Sent to pharmacy

## 2022-08-31 ENCOUNTER — OFFICE VISIT (OUTPATIENT)
Dept: FAMILY MEDICINE CLINIC | Facility: CLINIC | Age: 62
End: 2022-08-31
Payer: COMMERCIAL

## 2022-08-31 VITALS
WEIGHT: 157 LBS | DIASTOLIC BLOOD PRESSURE: 82 MMHG | HEART RATE: 73 BPM | BODY MASS INDEX: 26.8 KG/M2 | HEIGHT: 64 IN | TEMPERATURE: 97 F | SYSTOLIC BLOOD PRESSURE: 126 MMHG | OXYGEN SATURATION: 96 % | RESPIRATION RATE: 18 BRPM

## 2022-08-31 DIAGNOSIS — Z01.818 PREOPERATIVE CLEARANCE: Primary | ICD-10-CM

## 2022-08-31 DIAGNOSIS — S83.242D ACUTE MENISCAL TEAR, MEDIAL, LEFT, SUBSEQUENT ENCOUNTER: ICD-10-CM

## 2022-08-31 DIAGNOSIS — M17.12 OSTEOARTHRITIS OF LEFT KNEE, UNSPECIFIED OSTEOARTHRITIS TYPE: ICD-10-CM

## 2022-08-31 DIAGNOSIS — Z87.898 HISTORY OF ANGIOEDEMA: ICD-10-CM

## 2022-08-31 DIAGNOSIS — M23.007 PERIMENISCAL CYST OF LEFT KNEE: ICD-10-CM

## 2022-08-31 DIAGNOSIS — I10 ESSENTIAL HYPERTENSION, BENIGN: ICD-10-CM

## 2022-08-31 LAB
ANION GAP SERPL CALC-SCNC: 7 MMOL/L (ref 0–18)
BUN BLD-MCNC: 20 MG/DL (ref 7–18)
CALCIUM BLD-MCNC: 10.4 MG/DL (ref 8.5–10.1)
CHLORIDE SERPL-SCNC: 101 MMOL/L (ref 98–112)
CO2 SERPL-SCNC: 27 MMOL/L (ref 21–32)
CREAT BLD-MCNC: 0.8 MG/DL
FASTING STATUS PATIENT QL REPORTED: NO
GFR SERPLBLD BASED ON 1.73 SQ M-ARVRAT: 83 ML/MIN/1.73M2 (ref 60–?)
GLUCOSE BLD-MCNC: 84 MG/DL (ref 70–99)
OSMOLALITY SERPL CALC.SUM OF ELEC: 282 MOSM/KG (ref 275–295)
POTASSIUM SERPL-SCNC: 4.4 MMOL/L (ref 3.5–5.1)
SODIUM SERPL-SCNC: 135 MMOL/L (ref 136–145)

## 2022-08-31 PROCEDURE — 3079F DIAST BP 80-89 MM HG: CPT | Performed by: FAMILY MEDICINE

## 2022-08-31 PROCEDURE — 3074F SYST BP LT 130 MM HG: CPT | Performed by: FAMILY MEDICINE

## 2022-08-31 PROCEDURE — 3008F BODY MASS INDEX DOCD: CPT | Performed by: FAMILY MEDICINE

## 2022-08-31 PROCEDURE — 99214 OFFICE O/P EST MOD 30 MIN: CPT | Performed by: FAMILY MEDICINE

## 2022-08-31 PROCEDURE — 80048 BASIC METABOLIC PNL TOTAL CA: CPT | Performed by: FAMILY MEDICINE

## 2022-08-31 RX ORDER — LOSARTAN POTASSIUM 100 MG/1
100 TABLET ORAL DAILY
COMMUNITY
Start: 2022-08-22 | End: 2022-08-31 | Stop reason: ALTCHOICE

## 2022-09-01 ENCOUNTER — TELEPHONE (OUTPATIENT)
Dept: FAMILY MEDICINE CLINIC | Facility: CLINIC | Age: 62
End: 2022-09-01

## 2022-09-01 NOTE — TELEPHONE ENCOUNTER
Pre-op paperwork faxed to [address] [place] [address] at fax #[phone] and [phone]    Left detailed message to voicemail (per verbal release form consent with confirmed identifying message) of doctor's note below and note above. Patient was advised to call office back with any questions/concerns.

## 2022-09-01 NOTE — TELEPHONE ENCOUNTER
----- Message from Sanaz Lu MD sent at 9/1/2022  9:07 AM CDT -----  Sodium slightly down to 135 - please let patient know that this could be due to her diuretic. She has had this before. No immediate concerns. Normal kidney function otherwise. Will addend her pre-op to fax over to her surgeon's office.

## 2022-09-14 RX ORDER — METOPROLOL SUCCINATE 25 MG/1
TABLET, EXTENDED RELEASE ORAL
Qty: 30 TABLET | Refills: 0 | Status: SHIPPED | OUTPATIENT
Start: 2022-09-14

## 2022-09-14 NOTE — TELEPHONE ENCOUNTER
LOV 08/31/2022  BP Readings from Last 3 Encounters:  08/31/22 : 126/82  04/20/22 : 158/82  03/14/22 : 128/86      Last refill on 08/15/2022, for #30 tabs, with 0 refills    Hypertension Medications Protocol Passed 09/14/2022 09:35 AM   Protocol Details  CMP or BMP in past 12 months    Last serum creatinine< 2.0     No future appointments.     Order per protocol

## 2022-10-06 ENCOUNTER — OFFICE VISIT (OUTPATIENT)
Dept: DERMATOLOGY | Age: 62
End: 2022-10-06

## 2022-10-06 DIAGNOSIS — L57.0 AK (ACTINIC KERATOSIS): Primary | ICD-10-CM

## 2022-10-06 PROCEDURE — 99203 OFFICE O/P NEW LOW 30 MIN: CPT | Performed by: DERMATOLOGY

## 2022-10-06 RX ORDER — METOPROLOL SUCCINATE 25 MG/1
25 TABLET, EXTENDED RELEASE ORAL DAILY
COMMUNITY
Start: 2022-05-16

## 2022-10-06 RX ORDER — FLUOROURACIL 50 MG/G
CREAM TOPICAL 2 TIMES DAILY
Qty: 40 G | Refills: 0 | Status: SHIPPED | OUTPATIENT
Start: 2022-10-06

## 2022-10-07 ENCOUNTER — TELEPHONE (OUTPATIENT)
Dept: DERMATOLOGY | Age: 62
End: 2022-10-07

## 2022-10-11 ENCOUNTER — NURSE ONLY (OUTPATIENT)
Dept: DERMATOLOGY | Age: 62
End: 2022-10-11

## 2022-10-11 DIAGNOSIS — Z51.89 ENCOUNTER FOR WOUND RE-CHECK: Primary | ICD-10-CM

## 2022-10-11 RX ORDER — METOPROLOL SUCCINATE 25 MG/1
TABLET, EXTENDED RELEASE ORAL
Qty: 90 TABLET | Refills: 0 | Status: SHIPPED | OUTPATIENT
Start: 2022-10-11

## 2022-10-11 NOTE — TELEPHONE ENCOUNTER
Hypertension Medications Protocol Passed 10/11/2022 05:42 AM   Protocol Details  CMP or BMP in past 12 months    Last serum creatinine< 2.0    Appointment in past 6 or next 3 months     Last OV 8/31/22  Last lab 8/31/22  Last refilled 9/14/22  #30  0 refills    Refilled x 90 days per protocol

## 2023-01-06 ENCOUNTER — TELEPHONE (OUTPATIENT)
Dept: FAMILY MEDICINE CLINIC | Facility: CLINIC | Age: 63
End: 2023-01-06

## 2023-01-06 NOTE — TELEPHONE ENCOUNTER
LOV 08/31/2022 - pre-op visit with Dr. Faustino Meza    No future appointments.   Need to schedule pre-op appt    Advised pt of note below - she v/u  Pt reports DOS now 01/27/23 - advised pt to have their office send pre-op request with new DOS - she v/u    Future appt made  Future Appointments   Date Time Provider Haroon Collins   1/13/2023  2:40 PM Sanaz Hernandez MD [facility] [place] 2[facility] TONI Rosales     No further questions at this time

## 2023-01-06 NOTE — TELEPHONE ENCOUNTER
Received fax from [address] regarding pre-op request    DOS: 01/20/2023  At [facility]  Procedure: Bunionectomy and insertion of davison's total joint right foot    Need:  H&P within 30 days, EKG within 1 year if age 39 and over, CBC w/ Diff and CMP within 3 weeks    [facility][phone] [facility] #[phone]

## 2023-01-07 RX ORDER — METOPROLOL SUCCINATE 25 MG/1
TABLET, EXTENDED RELEASE ORAL
Qty: 90 TABLET | Refills: 0 | Status: SHIPPED | OUTPATIENT
Start: 2023-01-07

## 2023-01-07 NOTE — TELEPHONE ENCOUNTER
Hypertension Medications Protocol Passed 01/07/2023 01:31 AM   Protocol Details  CMP or BMP in past 12 months 8/31/22    Last serum creatinine< 2.0    Appointment in past 6 or next 3 months  8/31/22        BP Readings from Last 3 Encounters:  08/31/22 : 126/82  04/20/22 : 158/82  03/14/22 : 128/86    Filling per protocol

## 2023-01-13 ENCOUNTER — OFFICE VISIT (OUTPATIENT)
Dept: FAMILY MEDICINE CLINIC | Facility: CLINIC | Age: 63
End: 2023-01-13
Payer: COMMERCIAL

## 2023-01-13 VITALS
HEART RATE: 70 BPM | BODY MASS INDEX: 28.03 KG/M2 | WEIGHT: 160.19 LBS | OXYGEN SATURATION: 97 % | DIASTOLIC BLOOD PRESSURE: 82 MMHG | TEMPERATURE: 98 F | HEIGHT: 63.5 IN | SYSTOLIC BLOOD PRESSURE: 146 MMHG

## 2023-01-13 DIAGNOSIS — Z01.818 PREOPERATIVE EXAMINATION: Primary | ICD-10-CM

## 2023-01-13 DIAGNOSIS — I10 ESSENTIAL HYPERTENSION, BENIGN: ICD-10-CM

## 2023-01-13 LAB
ALBUMIN SERPL-MCNC: 4.4 G/DL (ref 3.4–5)
ALBUMIN/GLOB SERPL: 1.3 {RATIO} (ref 1–2)
ALP LIVER SERPL-CCNC: 74 U/L
ALT SERPL-CCNC: 44 U/L
ANION GAP SERPL CALC-SCNC: 11 MMOL/L (ref 0–18)
AST SERPL-CCNC: 38 U/L (ref 15–37)
BASOPHILS # BLD AUTO: 0.08 X10(3) UL (ref 0–0.2)
BASOPHILS NFR BLD AUTO: 1.3 %
BILIRUB SERPL-MCNC: 0.4 MG/DL (ref 0.1–2)
BUN BLD-MCNC: 17 MG/DL (ref 7–18)
CALCIUM BLD-MCNC: 9.9 MG/DL (ref 8.5–10.1)
CHLORIDE SERPL-SCNC: 100 MMOL/L (ref 98–112)
CO2 SERPL-SCNC: 26 MMOL/L (ref 21–32)
CREAT BLD-MCNC: 0.69 MG/DL
EOSINOPHIL # BLD AUTO: 0.09 X10(3) UL (ref 0–0.7)
EOSINOPHIL NFR BLD AUTO: 1.4 %
ERYTHROCYTE [DISTWIDTH] IN BLOOD BY AUTOMATED COUNT: 12.7 %
FASTING STATUS PATIENT QL REPORTED: NO
GFR SERPLBLD BASED ON 1.73 SQ M-ARVRAT: 97 ML/MIN/1.73M2 (ref 60–?)
GLOBULIN PLAS-MCNC: 3.5 G/DL (ref 2.8–4.4)
GLUCOSE BLD-MCNC: 94 MG/DL (ref 70–99)
HCT VFR BLD AUTO: 41.8 %
HGB BLD-MCNC: 14 G/DL
IMM GRANULOCYTES # BLD AUTO: 0.01 X10(3) UL (ref 0–1)
IMM GRANULOCYTES NFR BLD: 0.2 %
LYMPHOCYTES # BLD AUTO: 2.32 X10(3) UL (ref 1–4)
LYMPHOCYTES NFR BLD AUTO: 36.4 %
MCH RBC QN AUTO: 31.6 PG (ref 26–34)
MCHC RBC AUTO-ENTMCNC: 33.5 G/DL (ref 31–37)
MCV RBC AUTO: 94.4 FL
MONOCYTES # BLD AUTO: 0.55 X10(3) UL (ref 0.1–1)
MONOCYTES NFR BLD AUTO: 8.6 %
NEUTROPHILS # BLD AUTO: 3.32 X10 (3) UL (ref 1.5–7.7)
NEUTROPHILS # BLD AUTO: 3.32 X10(3) UL (ref 1.5–7.7)
NEUTROPHILS NFR BLD AUTO: 52.1 %
OSMOLALITY SERPL CALC.SUM OF ELEC: 285 MOSM/KG (ref 275–295)
PLATELET # BLD AUTO: 181 10(3)UL (ref 150–450)
POTASSIUM SERPL-SCNC: 4.1 MMOL/L (ref 3.5–5.1)
PROT SERPL-MCNC: 7.9 G/DL (ref 6.4–8.2)
RBC # BLD AUTO: 4.43 X10(6)UL
SODIUM SERPL-SCNC: 137 MMOL/L (ref 136–145)
WBC # BLD AUTO: 6.4 X10(3) UL (ref 4–11)

## 2023-01-13 PROCEDURE — 3077F SYST BP >= 140 MM HG: CPT | Performed by: FAMILY MEDICINE

## 2023-01-13 PROCEDURE — 99214 OFFICE O/P EST MOD 30 MIN: CPT | Performed by: FAMILY MEDICINE

## 2023-01-13 PROCEDURE — 3008F BODY MASS INDEX DOCD: CPT | Performed by: FAMILY MEDICINE

## 2023-01-13 PROCEDURE — 3079F DIAST BP 80-89 MM HG: CPT | Performed by: FAMILY MEDICINE

## 2023-01-13 PROCEDURE — 85025 COMPLETE CBC W/AUTO DIFF WBC: CPT | Performed by: FAMILY MEDICINE

## 2023-01-13 PROCEDURE — 80053 COMPREHEN METABOLIC PANEL: CPT | Performed by: FAMILY MEDICINE

## 2023-01-13 RX ORDER — AMLODIPINE BESYLATE 10 MG/1
10 TABLET ORAL DAILY
Qty: 90 TABLET | Refills: 0 | Status: SHIPPED | OUTPATIENT
Start: 2023-01-13 | End: 2023-04-13

## 2023-01-13 NOTE — PATIENT INSTRUCTIONS
Rx Amlodipine 10 mg once daily started today (this is new blood pressure medication)   Your blood pressure will need better control   Please check your numbers at home and let us know how you are doing in 1 week  Anticipate this is better control prior to surgery

## 2023-01-16 ENCOUNTER — TELEPHONE (OUTPATIENT)
Dept: FAMILY MEDICINE CLINIC | Facility: CLINIC | Age: 63
End: 2023-01-16

## 2023-01-16 NOTE — TELEPHONE ENCOUNTER
Pre-op clearance, labs, and EKG - faxed to [facility] at fax #[phone] and #[phone]    [facility] sent to pt  Notify me if not read by 01/20/23

## 2023-01-16 NOTE — TELEPHONE ENCOUNTER
----- Message from Sanaz Vilchis MD sent at 1/15/2023 12:49 PM CST -----  Labs look good. Preop note addendum made. Please fax over to surgeon.

## 2023-01-16 NOTE — TELEPHONE ENCOUNTER
Pt reports she was advised by pharmacist that new Rx amlodipine 10 mg is higher dose - so pt only took half tab over weekend    Pt reports this am BP's 144/102, 138/98 - took back to back, right after taking BP med this am - she took BP meds - 08:30am   Metoprolol 25 mg and 5 mg amlodipine     Repeat BP just now - 165/92    Pt reports did buy new BP arm machine - BP readings higher than when in office    Please advise, thank you

## 2023-01-16 NOTE — TELEPHONE ENCOUNTER
Sanaz Colindres MD [place], Mydhili Nurse 16 minutes ago (1:02 PM)     Pasha  in that case lets have her take the full dose of Amlodipine, along with the Metoprolol, Spironolactone and the hydrochlorothiazide on a daily basis. Continue with this regimen and reduce salt intake by 25% to help with better blood pressure contro l. Journal numbers over the next 1 week.

## 2023-01-20 ENCOUNTER — TELEPHONE (OUTPATIENT)
Dept: FAMILY MEDICINE CLINIC | Facility: CLINIC | Age: 63
End: 2023-01-20

## 2023-01-20 RX ORDER — METOPROLOL SUCCINATE 50 MG/1
50 TABLET, EXTENDED RELEASE ORAL DAILY
Qty: 90 TABLET | Refills: 0 | Status: SHIPPED | OUTPATIENT
Start: 2023-01-20 | End: 2023-04-20

## 2023-01-20 NOTE — TELEPHONE ENCOUNTER
Advised patient of Doctor's note below. Patient verbalized understanding. Advised pt she may take 2 tabs of 25 MG for now - as she just picked up Rx 01/07/23 - but Rx for 50s available - she v/u    No further questions at this time.

## 2023-01-20 NOTE — TELEPHONE ENCOUNTER
PT CALLED AND THAT SHE HAS BEEN HAVING SOME BP ISSUES.   WAS PUT IN NEW MEDS AND STARTED OFF OK, BUT SEEMS TO ELEVATED AGAIN. PT SUPPOSE TO HAVE SURGERY NEXT Friday.     BP READINGS:    1/18: 144/93  1/19: 139/88    146/84  140/87    TODAY:    147/90  145/79  154/89    LOOKING FOR RECOMMENDATIONS    THANK YOU

## 2023-01-20 NOTE — TELEPHONE ENCOUNTER
Sent Rx for Metoprolol ER 50 mg (increased this dose from 25 mg --> 50 mg) and continue with Amlodipine 10 mg. Send us blood pressures over the next 5 days after starting. I am hopeful that this should keep it within normal range, please let us know if you are drowsy or dizzy.

## 2023-02-10 PROCEDURE — 84443 ASSAY THYROID STIM HORMONE: CPT | Performed by: NURSE PRACTITIONER

## 2023-02-10 PROCEDURE — 84439 ASSAY OF FREE THYROXINE: CPT | Performed by: NURSE PRACTITIONER

## 2023-02-25 RX ORDER — HYDROCHLOROTHIAZIDE 25 MG/1
TABLET ORAL
Qty: 90 TABLET | Refills: 3 | Status: SHIPPED | OUTPATIENT
Start: 2023-02-25

## 2023-02-25 NOTE — TELEPHONE ENCOUNTER
LOV 2/10/23 w/ APRN  Last labs 01/13/23  BP Readings from Last 3 Encounters:  02/10/23 : 134/88  01/13/23 : 146/82  08/31/22 : 126/82      Last refill on 02/21/22, for #90 tabs, with 3 refills  hydroCHLOROthiazide 25 MG Oral Tab  Hypertension Medications Protocol Passed 02/25/2023 08:44 AM   Protocol Details  CMP or BMP in past 12 months    Last serum creatinine< 2.0    Appointment in past 6 or next 3 months     Future Appointments   Date Time Provider Haroon Collins   3/3/2023 10:40 AM Kishan JENNYFER [place] [facility] [place]     Order per protocol

## 2023-03-04 DIAGNOSIS — F41.8 SITUATIONAL ANXIETY: ICD-10-CM

## 2023-03-04 RX ORDER — ESCITALOPRAM OXALATE 10 MG/1
10 TABLET ORAL DAILY
Qty: 30 TABLET | Refills: 0 | OUTPATIENT
Start: 2023-03-04 | End: 2023-04-03

## 2023-03-27 RX ORDER — SPIRONOLACTONE 50 MG/1
TABLET, FILM COATED ORAL
Qty: 90 TABLET | Refills: 0 | Status: SHIPPED | OUTPATIENT
Start: 2023-03-27

## 2023-03-27 NOTE — TELEPHONE ENCOUNTER
Hypertension Medications Protocol Passed 03/27/2023 08:02 AM   Protocol Details  CMP or BMP in past 12 months    Last serum creatinine< 2.0    Appointment in past 6 or next 3 months     Last OV 3/3/23  Last lab 1/13/23  CMP/creat 0.69  Last refilled 2/17/22  #90  3 refill

## 2023-04-03 DIAGNOSIS — F41.8 SITUATIONAL ANXIETY: ICD-10-CM

## 2023-04-03 RX ORDER — ESCITALOPRAM OXALATE 20 MG/1
20 TABLET ORAL DAILY
Qty: 30 TABLET | Refills: 0 | OUTPATIENT
Start: 2023-04-03 | End: 2023-07-02

## 2023-04-11 RX ORDER — AMLODIPINE BESYLATE 10 MG/1
10 TABLET ORAL DAILY
Qty: 90 TABLET | Refills: 0 | Status: SHIPPED | OUTPATIENT
Start: 2023-04-11 | End: 2023-07-10

## 2023-04-11 NOTE — TELEPHONE ENCOUNTER
Hypertension Medications Protocol Passed 04/11/2023 07:49 AM   Protocol Details  CMP or BMP in past 12 months    Last serum creatinine< 2.0    Appointment in past 6 or next 3 months          LOV 1/13/23    No future appointments.       MEDICATION PAST PROTOCOL    MEDICATION SENT

## 2023-05-31 RX ORDER — METOPROLOL SUCCINATE 50 MG/1
TABLET, EXTENDED RELEASE ORAL
Qty: 90 TABLET | Refills: 0 | Status: SHIPPED | OUTPATIENT
Start: 2023-05-31

## 2023-05-31 NOTE — TELEPHONE ENCOUNTER
Hypertension Medications Protocol Passed 05/31/2023 02:25 PM   Protocol Details  CMP or BMP in past 12 months    Last serum creatinine< 2.0    Appointment in past 6 or next 3 months       Medication refilled per protocol

## 2023-06-19 ENCOUNTER — TELEPHONE (OUTPATIENT)
Dept: FAMILY MEDICINE CLINIC | Facility: CLINIC | Age: 63
End: 2023-06-19

## 2023-06-19 ENCOUNTER — OFFICE VISIT (OUTPATIENT)
Dept: FAMILY MEDICINE CLINIC | Facility: CLINIC | Age: 63
End: 2023-06-19
Payer: COMMERCIAL

## 2023-06-19 VITALS
BODY MASS INDEX: 27 KG/M2 | TEMPERATURE: 98 F | DIASTOLIC BLOOD PRESSURE: 80 MMHG | HEART RATE: 83 BPM | OXYGEN SATURATION: 95 % | SYSTOLIC BLOOD PRESSURE: 120 MMHG | WEIGHT: 156.19 LBS

## 2023-06-19 DIAGNOSIS — Z12.31 ENCOUNTER FOR SCREENING MAMMOGRAM FOR MALIGNANT NEOPLASM OF BREAST: ICD-10-CM

## 2023-06-19 DIAGNOSIS — Z29.8 ALTITUDE SICKNESS PREVENTATIVE MEASURES: Primary | ICD-10-CM

## 2023-06-19 PROCEDURE — 3079F DIAST BP 80-89 MM HG: CPT | Performed by: NURSE PRACTITIONER

## 2023-06-19 PROCEDURE — 3074F SYST BP LT 130 MM HG: CPT | Performed by: NURSE PRACTITIONER

## 2023-06-19 PROCEDURE — 99213 OFFICE O/P EST LOW 20 MIN: CPT | Performed by: NURSE PRACTITIONER

## 2023-06-19 RX ORDER — ACETAZOLAMIDE 125 MG/1
TABLET ORAL
Qty: 14 TABLET | Refills: 0 | Status: SHIPPED | OUTPATIENT
Start: 2023-06-19

## 2023-06-19 NOTE — TELEPHONE ENCOUNTER
Discussed with Sharyle Plenty regarding note below - please schedule appt    Advised patient of JENNYFER's note above. Patient verbalized understanding.  No further questions at this time    Future Appointments   Date Time Provider Haroon Collins   6/19/2023  3:00 PM JENNYFER Gomes [facility] TONI Pena

## 2023-06-19 NOTE — TELEPHONE ENCOUNTER
Pt requesting medication for altitude sickness. Pt son has wedding next week and is asking if there is a medication to prevent her getting sick. Pt has gotten sick out there before. Asking if Nilesh Meyers can take a look at it.

## 2023-06-23 RX ORDER — SPIRONOLACTONE 50 MG/1
TABLET, FILM COATED ORAL
Qty: 90 TABLET | Refills: 0 | Status: SHIPPED | OUTPATIENT
Start: 2023-06-23

## 2023-06-23 NOTE — TELEPHONE ENCOUNTER
Hypertension Medications Protocol Passed 06/23/2023 01:31 AM   Protocol Details  CMP or BMP in past 12 months    Last serum creatinine< 2.0    Appointment in past 6 or next 3 months     LOV 6/19/23    Medication Past protocol    Medication sent

## 2023-06-26 ENCOUNTER — HOSPITAL ENCOUNTER (OUTPATIENT)
Dept: MAMMOGRAPHY | Facility: HOSPITAL | Age: 63
Discharge: HOME | End: 2023-06-26
Attending: NURSE PRACTITIONER
Payer: COMMERCIAL

## 2023-06-26 DIAGNOSIS — Z12.31 ENCOUNTER FOR SCREENING MAMMOGRAM FOR MALIGNANT NEOPLASM OF BREAST: ICD-10-CM

## 2023-06-26 PROCEDURE — 77067 SCR MAMMO BI INCL CAD: CPT | Performed by: NURSE PRACTITIONER

## 2023-06-26 PROCEDURE — 77063 BREAST TOMOSYNTHESIS BI: CPT | Performed by: NURSE PRACTITIONER

## 2023-07-07 ENCOUNTER — HOSPITAL ENCOUNTER (OUTPATIENT)
Dept: MAMMOGRAPHY | Facility: HOSPITAL | Age: 63
Discharge: HOME | End: 2023-07-07
Attending: NURSE PRACTITIONER
Payer: COMMERCIAL

## 2023-07-07 DIAGNOSIS — R92.2 INCONCLUSIVE MAMMOGRAM: ICD-10-CM

## 2023-07-07 PROCEDURE — 77066 DX MAMMO INCL CAD BI: CPT | Performed by: NURSE PRACTITIONER

## 2023-07-07 PROCEDURE — 77062 BREAST TOMOSYNTHESIS BI: CPT | Performed by: NURSE PRACTITIONER

## 2023-07-07 PROCEDURE — 76642 ULTRASOUND BREAST LIMITED: CPT | Performed by: NURSE PRACTITIONER

## 2023-07-07 NOTE — IMAGING NOTE
This Breast Care RN assisted Dr. Lilly Chaves with recommendation for a left breast 1 site ultrasound guided biopsy for mass. Procedure reviewed and all questions answered. Emotional and educational support given. On the day of the biopsy, pt instructed to take Tylenol 1000mg PO, eat a light meal & bring or wear a sports bra. Post biopsy care also reviewed with pt to include NO lifting more than 5lbs, no exercising or housework (limit upper body movement) for 24-48 hrs post biopsy. Patient denies blood thinners, bleeding disorders, liver disease, and chemo. Pt verbalized understanding. Our breast center schedulers will be calling to schedule an appt that is convenient for pt.

## 2023-07-13 DIAGNOSIS — F41.8 SITUATIONAL ANXIETY: ICD-10-CM

## 2023-07-13 RX ORDER — ESCITALOPRAM OXALATE 20 MG/1
20 TABLET ORAL DAILY
Qty: 90 TABLET | Refills: 0 | Status: SHIPPED | OUTPATIENT
Start: 2023-07-13 | End: 2023-10-11

## 2023-07-13 RX ORDER — AMLODIPINE BESYLATE 10 MG/1
10 TABLET ORAL DAILY
Qty: 90 TABLET | Refills: 1 | Status: SHIPPED | OUTPATIENT
Start: 2023-07-13

## 2023-07-13 NOTE — TELEPHONE ENCOUNTER
Last OV 6/19/23  Last refilled on 4/3/23 for # 90 with 0 refills  Future Appointments   Date Time Provider Haroon Collins   7/21/2023 10:30 AM [facility] BREAST [facility]        Thank you.

## 2023-07-21 ENCOUNTER — HOSPITAL ENCOUNTER (OUTPATIENT)
Dept: MAMMOGRAPHY | Facility: HOSPITAL | Age: 63
Discharge: HOME | End: 2023-07-21
Attending: NURSE PRACTITIONER
Payer: COMMERCIAL

## 2023-07-21 DIAGNOSIS — R92.8 ABNORMAL MAMMOGRAM OF LEFT BREAST: ICD-10-CM

## 2023-07-21 DIAGNOSIS — N63.0 BREAST NODULE: ICD-10-CM

## 2023-07-21 PROCEDURE — 77065 DX MAMMO INCL CAD UNI: CPT | Performed by: NURSE PRACTITIONER

## 2023-07-21 PROCEDURE — 88305 TISSUE EXAM BY PATHOLOGIST: CPT | Performed by: NURSE PRACTITIONER

## 2023-07-21 PROCEDURE — 19083 BX BREAST 1ST LESION US IMAG: CPT | Performed by: NURSE PRACTITIONER

## 2023-07-24 ENCOUNTER — MED REC SCAN ONLY (OUTPATIENT)
Dept: FAMILY MEDICINE CLINIC | Facility: CLINIC | Age: 63
End: 2023-07-24

## 2023-07-24 NOTE — IMAGING NOTE
Spoke with Vern Cabello post ultrasound guided left breast biopsy. Introduced myself as breast care coordinator. Name and date of birth verified by patient. Reinforced post biopsy care and instruction. Ms. Rolo Sefl denies any issues with biopsy site- bleeding, drainage, redness, tenderness. Pathology results and recommendations discussed as follows:   Final Diagnosis:   Ultrasound-guided core biopsy, breast, left, 10:00:  -Breast tissue with dense stromal fibrosis. -Negative for malignancy. See EMR for complete pathology report    Concordance verified by Dr. Ge Luis. Recommendation: six month follow-up diagnostic mammogram and ultrasound. Vern Cabello verbalized understanding and agreement to the above. Ms. Rolo Self instructed to perform breast self-exams and notify physician of any changes/concerns. Vern Cabello verbalized agreement.

## 2023-07-25 ENCOUNTER — TELEPHONE (OUTPATIENT)
Dept: FAMILY MEDICINE CLINIC | Facility: CLINIC | Age: 63
End: 2023-07-25

## 2023-07-25 NOTE — TELEPHONE ENCOUNTER
----- Message from JENNYFER Avila sent at 7/24/2023  9:36 AM CDT -----  Results reviewed. Repeat mammogram and ultrasound in 6 months    JENNYFER Avila  7/24/2023  8:10 AM CDT Back to Top      Breast biopsy negative for malignancy. Sanaz Beaver MD  7/23/2023  7:32 AM CDT       Noted. Negative for Malignancy/ Please inform patient.

## 2023-08-03 NOTE — TELEPHONE ENCOUNTER
Left detailed message to voicemail (per verbal release form consent with confirmed identifying message) of APRN's note below. Patient was advised to call office back with any questions/concerns.     Recall placed - repeat mammogram/US in 6 months

## 2023-08-29 RX ORDER — METOPROLOL SUCCINATE 50 MG/1
TABLET, EXTENDED RELEASE ORAL
Qty: 90 TABLET | Refills: 0 | Status: SHIPPED | OUTPATIENT
Start: 2023-08-29

## 2023-09-20 RX ORDER — SPIRONOLACTONE 50 MG/1
50 TABLET, FILM COATED ORAL DAILY
Qty: 90 TABLET | Refills: 1 | Status: SHIPPED | OUTPATIENT
Start: 2023-09-20

## 2023-09-20 NOTE — TELEPHONE ENCOUNTER
Spironolactone-   LRF  6/23/23 #90  LOV  6/19/23 Acute  BP Readings from Last 3 Encounters:  06/19/23 : 120/80  03/03/23 : 138/82  02/10/23 : 134/88    No future appointments.     Hypertension Medications Protocol [id number]/20/2023 11:35 AM   Protocol Details CMP or BMP in past 12 months    Last serum creatinine< 2.0    Appointment in past 6 or next 3 months     Refilled per protocol

## 2023-10-05 ENCOUNTER — OFFICE VISIT (OUTPATIENT)
Dept: DERMATOLOGY | Age: 63
End: 2023-10-05

## 2023-10-05 DIAGNOSIS — L57.0 AK (ACTINIC KERATOSIS): Primary | ICD-10-CM

## 2023-10-05 PROCEDURE — 17000 DESTRUCT PREMALG LESION: CPT | Performed by: DERMATOLOGY

## 2023-10-05 PROCEDURE — 99214 OFFICE O/P EST MOD 30 MIN: CPT | Performed by: DERMATOLOGY

## 2023-10-05 RX ORDER — ESCITALOPRAM OXALATE 20 MG/1
20 TABLET ORAL DAILY
COMMUNITY
Start: 2023-07-13

## 2023-10-05 RX ORDER — AMLODIPINE BESYLATE 10 MG/1
10 TABLET ORAL DAILY
COMMUNITY
Start: 2023-07-13

## 2023-10-08 DIAGNOSIS — F41.8 SITUATIONAL ANXIETY: ICD-10-CM

## 2023-10-09 RX ORDER — ESCITALOPRAM OXALATE 20 MG/1
20 TABLET ORAL DAILY
Qty: 90 TABLET | Refills: 0 | Status: SHIPPED | OUTPATIENT
Start: 2023-10-09 | End: 2024-01-07

## 2023-11-16 ENCOUNTER — TELEMEDICINE (OUTPATIENT)
Dept: FAMILY MEDICINE CLINIC | Facility: CLINIC | Age: 63
End: 2023-11-16
Payer: MEDICARE

## 2023-11-16 ENCOUNTER — TELEPHONE (OUTPATIENT)
Dept: FAMILY MEDICINE CLINIC | Facility: CLINIC | Age: 63
End: 2023-11-16

## 2023-11-16 DIAGNOSIS — J01.10 ACUTE NON-RECURRENT FRONTAL SINUSITIS: Primary | ICD-10-CM

## 2023-11-16 RX ORDER — DOXYCYCLINE HYCLATE 100 MG/1
100 CAPSULE ORAL 2 TIMES DAILY
Qty: 14 CAPSULE | Refills: 0 | Status: SHIPPED | OUTPATIENT
Start: 2023-11-16 | End: 2023-11-23

## 2023-11-16 NOTE — TELEPHONE ENCOUNTER
[id number] - [place] - [place] [address] [phone], [phone] [address] [place] [address]   Phone: [phone] Fax: [phone]   Hours: Not open 24 hours       PATIENT SAYS IT IS THAT TIME WHEN SHE STARTS GETTING A SINUS INFECTION.  ASKING IF WE CAN SQUEEZE HER IN AND/OR CALL SOMETHING IN

## 2023-11-16 NOTE — PROGRESS NOTES
VIDEO VISIT    CHIEF COMPLAINT:  No chief complaint on file. HISTORY OF PRESENT ILLNESS:  This is a 70year old female who verbally consents to a video visit today for the concern of sinus infection. Six day history of \"feeling like crap\" with a dull headache, nasal congestion, watery eyes in the morning, and sinus pressure. Has had previous sinus infections in the past and feels as this is the beginning of one. Feels feverish, denies known fevers. Has been taking OTC dayquil and delsym. Denies use of antihistamine or nasal sprays. Agreeable to trial of antihistamine for 24 hours, if no improvement will begin abx as prescribed. ALLERGIES:  Allergies   Allergen Reactions    Bee Venom ANAPHYLAXIS     Bee Stings    Pcn [Penicillins] ANAPHYLAXIS and SWELLING    Other      Bee Stings       CURRENT MEDICATIONS:  Current Outpatient Medications   Medication Sig Dispense Refill    ESCITALOPRAM 20 MG Oral Tab Take 1 tablet (20 mg total) by mouth daily. 90 tablet 0    spironolactone 50 MG Oral Tab Take 1 tablet (50 mg total) by mouth daily. 90 tablet 1    METOPROLOL SUCCINATE ER 50 MG Oral Tablet 24 Hr TAKE ONE TABLET BY MOUTH ONE TIME DAILY 90 tablet 0    amLODIPine 10 MG Oral Tab Take 1 tablet (10 mg total) by mouth daily. 90 tablet 1    acetaZOLAMIDE 125 MG Oral Tab Take 125 mg PO twice daily; Start day before travel. May be discontinued after staying at the same elevation for 2 to 4 days or if descent is initiated 14 tablet 0    HYDROCHLOROTHIAZIDE 25 MG Oral Tab TAKE ONE TABLET BY MOUTH DAILY 90 tablet 3    Turmeric 400 MG Oral Cap Take by mouth. EPINEPHrine 0.3 MG/0.3ML Injection Solution Auto-injector Inject 0.3 mL (1 each total) into the muscle as needed. 2 each 0    HYDROXYCHLOROQUINE 200 MG Oral Tab TAKE TWO TABLETS BY MOUTH DAILY 180 tablet 2    SULFASALAZINE 500 MG Oral Tab TAKE TWO TABLETS BY MOUTH DAILY 180 tablet 1    Multiple Vitamins-Minerals (OCUVITE EXTRA) Oral Tab Take by mouth. Fish Oil-Cholecalciferol (FISH OIL + D3 OR) Take  by mouth. MEDICAL HISTORY:  Past Medical History:   Diagnosis Date    Bee sting allergy     has epi pen    Chronic back pain     d/t lumbar ddd    Osteoarthrosis, unspecified whether generalized or localized, unspecified site     and likely rheumatoid arthritis    Unspecified essential hypertension      Past Surgical History:   Procedure Laterality Date    TARYN BIOPSY STEREO NODULE 1 SITE LEFT (CPT=19081)  2017    benign    TARYN BIOPSY STEREO NODULE 2 SITE BILAT (CPT=19081/25446)  2017    benign-fibrous mastopathy     Family History   Problem Relation Age of Onset    Other (Other) Mother         going into assisted living, start of dementia,    Hypertension Son     Cancer Paternal Grandmother         female cancer she survived in her [de-identified]    Ovarian Cancer Paternal Grandmother 80    Cancer Father      Family Status   Relation Status    Mo  at age 80        complications of dementia    Son Alive    PGMA  at age 80        \"natural causes\"    Fa  at age 47        metastatic lung cancer    Uche (Not Specified)    MGMA  at age 80        ? ovarian cancer    MGFA  at age 64O        complications of infection after surgery    PGFA     Sis Alive    [address]    Son Alive     Social History     Socioeconomic History    Marital status:    Tobacco Use    Smoking status: Former     Years: 2     Types: Cigarettes     Quit date: 1980     Years since quittin.9    Smokeless tobacco: Never   Vaping Use    Vaping Use: Never used   Substance and Sexual Activity    Alcohol use: Yes     Alcohol/week: 0.0 standard drinks of alcohol     Comment: 1-2/day    Drug use: No       ROS:    As stated in HPI    VITALS:  No vitals were obtained by clinical staff during this visit. PHYSICAL EXAM:    Physical exam is limited due to video visit. Azeem Dave serves as a reliable historian.   Speech is clear and organized without difficulty speaking in full sentences. No shortness of breath noted during our conversation. + dry intermittent cough, nasally/hoarseness of voice    ASSESSMENT & PLAN:    1. Acute non-recurrent frontal sinusitis  Trial antihistamine 24 hours  Begin abx if no improvement  - doxycycline 100 MG Oral Cap; Take 1 capsule (100 mg total) by mouth 2 (two) times daily for 7 days. Dispense: 14 capsule;  Refill: 0

## 2023-11-16 NOTE — TELEPHONE ENCOUNTER
Pt reports sinus infection - get this this time of year  Has been congested, coughing, PND  Dull achey headache  Puffy around eyes  \"Feeling like crap\"    Symptoms started Saturday   Has not tested for covid yet - pt reports will complete home covid test    Pt denies fevers, but feels warmer - pt states she \"normally runs cooler\" and temp is \"normal\" 98.6, but warm for her    Has been taking OTC dayquil and delsym    Pt requesting appt for Rx    Pt agreeable to schedule video visit with JENNYFER  Future appt made  Future Appointments   Date Time Provider Haroon Collins   11/16/2023  3:00 PM Leopoldo Charleston, APRN [facility] [employer] as FYI only

## 2023-12-04 RX ORDER — METOPROLOL SUCCINATE 50 MG/1
50 TABLET, EXTENDED RELEASE ORAL DAILY
Qty: 90 TABLET | Refills: 0 | Status: SHIPPED | OUTPATIENT
Start: 2023-12-04

## 2023-12-04 NOTE — TELEPHONE ENCOUNTER
Received fax from [address] [place] [address] regarding refill request    LOV/Telemedicine 11/16/23  Last labs 11/01/23  Last refill on 08/29/23, for #90 tabs, with 0 refills  METOPROLOL SUCCINATE ER 50 MG Oral Tablet 24 Hr   Hypertension Medications Protocol [id number]/04/2023 05:12 PM   Protocol Details CMP or BMP in past 12 months    Last serum creatinine< 2.0    Appointment in past 6 or next 3 months     Future Appointments   Date Time Provider Haroon Collins   12/6/2023  2:15 PM Valarie Pulido MD [facility] TONI Rodríguez per protocol

## 2023-12-06 ENCOUNTER — OFFICE VISIT (OUTPATIENT)
Dept: FAMILY MEDICINE CLINIC | Facility: CLINIC | Age: 63
End: 2023-12-06
Payer: MEDICARE

## 2023-12-06 VITALS
DIASTOLIC BLOOD PRESSURE: 80 MMHG | HEART RATE: 61 BPM | OXYGEN SATURATION: 99 % | WEIGHT: 161 LBS | TEMPERATURE: 99 F | SYSTOLIC BLOOD PRESSURE: 142 MMHG | BODY MASS INDEX: 28 KG/M2

## 2023-12-06 DIAGNOSIS — L08.9 INFECTED SEBACEOUS CYST: ICD-10-CM

## 2023-12-06 DIAGNOSIS — L72.3 INFECTED SEBACEOUS CYST: ICD-10-CM

## 2023-12-06 DIAGNOSIS — Z12.11 SCREEN FOR COLON CANCER: Primary | ICD-10-CM

## 2023-12-06 PROCEDURE — 3079F DIAST BP 80-89 MM HG: CPT | Performed by: FAMILY MEDICINE

## 2023-12-06 PROCEDURE — 3077F SYST BP >= 140 MM HG: CPT | Performed by: FAMILY MEDICINE

## 2023-12-06 PROCEDURE — 99214 OFFICE O/P EST MOD 30 MIN: CPT | Performed by: FAMILY MEDICINE

## 2023-12-06 RX ORDER — SULFAMETHOXAZOLE AND TRIMETHOPRIM 800; 160 MG/1; MG/1
1 TABLET ORAL 2 TIMES DAILY
Qty: 14 TABLET | Refills: 0 | Status: SHIPPED | OUTPATIENT
Start: 2023-12-06 | End: 2023-12-13

## 2023-12-07 ENCOUNTER — TELEPHONE (OUTPATIENT)
Dept: FAMILY MEDICINE CLINIC | Facility: CLINIC | Age: 63
End: 2023-12-07

## 2023-12-07 DIAGNOSIS — Z00.00 WELL ADULT EXAM: Primary | ICD-10-CM

## 2023-12-07 DIAGNOSIS — E55.9 HYPOVITAMINOSIS D: ICD-10-CM

## 2023-12-07 NOTE — TELEPHONE ENCOUNTER
PT ADV WAS SEEN IN OFFICE YESTERDAY AND WAS ADV  PLACING BLOOD WORK ORDERS. PLEASE PLACE ORDERS.     PT HAS FUTURE APTS    Future Appointments   Date Time Provider Haroon Karina   1/18/2024  8:45 AM [facility] NURSE [facility] Abby Baldwin   1/23/2024  9:30 AM Shraddha Ferrell MD [address] [address]   1/30/2024 10:00 AM Ace Watson MD [facility] [place] Record YOU

## 2023-12-07 NOTE — TELEPHONE ENCOUNTER
Pt reports Tuesday morning - lump for forehead  Yesterday - seen in office - lump spread more  This am - spread to corner of eyelid    Advised patient of Dr. Chaya Esquivel note below. Patient verbalized understanding. Pt reports to taking the abx. Pt agreeable to go to IC. Advised pt to call office back if follow-up needed - she v/u. No further questions at this time.

## 2023-12-08 ENCOUNTER — TELEPHONE (OUTPATIENT)
Dept: FAMILY MEDICINE CLINIC | Facility: CLINIC | Age: 63
End: 2023-12-08

## 2023-12-08 NOTE — TELEPHONE ENCOUNTER
PATIENT WAS ADVISED TO GO TO ER. PATIENT SAYS THE CT SCAN SHOWED SHE HAS ORBITAL CELLULITIS. SHE SAYS THE ER PHYSICIAN PRESCRIBED CLINDAMYCIN. PATIENT ON BACTRUM SHE BELIEVES FROM DR Aubrie Weiss.  SHOULD SHE TAKE THE CLINDAMYCIN WITH IT??

## 2023-12-08 NOTE — TELEPHONE ENCOUNTER
Pt was seen at [facility] and being treated for periorbital cellulitis with clindamycin    Should she continue the bactrim ordered by Dr. Louis Phillips?

## 2023-12-08 NOTE — TELEPHONE ENCOUNTER
Spoke with the patient and the fluid has drained into the eyelid-   No pain but the eyelid is swollen half shut. She states that if someone could just Poke it, it would be better.         Spoke with Dr. Molly Waite and she states that she recommended the pt go to the ER- she may need additional imaging- not sure if swollen/fluid filled

## 2023-12-08 NOTE — TELEPHONE ENCOUNTER
PATIENT IS CALLING THIS MORNING. SHE SAYS THE BUMP HAS DRAINED INTO HER EYE LID. CALLED YESTERDAY AFTER HOURS AND WAS ADVISED TO GO TO . THIS MORNING HER EYELID IS ALMOST SHUT FROM THE DRAINAGE. PATIENT ASKING WHAT SHE SHOULD DO.

## 2023-12-11 ENCOUNTER — TELEPHONE (OUTPATIENT)
Dept: FAMILY MEDICINE CLINIC | Facility: CLINIC | Age: 63
End: 2023-12-11

## 2023-12-11 NOTE — TELEPHONE ENCOUNTER
Pt states she ended up in ER - no fluid but told she has periorbital cellulitis gradually working way down. She was told she needs to have cyst taken out that is on her head. Currently prescribed anti-biotics. Not sure what to do now/next steps? Please advise. Thank you!

## 2023-12-14 ENCOUNTER — TELEPHONE (OUTPATIENT)
Dept: FAMILY MEDICINE CLINIC | Facility: CLINIC | Age: 63
End: 2023-12-14

## 2023-12-14 NOTE — TELEPHONE ENCOUNTER
PATIENT SAYS SHE NEEDS TO REMOVE THE CYST FROM HER HEAD. ASKING IF DR Marty Warner NEEDS TO SEE HER FIRST AND/OR RECOMMEND A SURGEON FOR HER.

## 2023-12-26 ENCOUNTER — OFFICE VISIT (OUTPATIENT)
Facility: LOCATION | Age: 63
End: 2023-12-26
Payer: MEDICARE

## 2023-12-26 VITALS — TEMPERATURE: 99 F | HEART RATE: 68 BPM

## 2023-12-26 DIAGNOSIS — L72.3 SEBACEOUS CYST: Primary | ICD-10-CM

## 2023-12-26 PROCEDURE — 99204 OFFICE O/P NEW MOD 45 MIN: CPT | Performed by: SURGERY

## 2023-12-31 DIAGNOSIS — F41.8 SITUATIONAL ANXIETY: ICD-10-CM

## 2024-01-02 RX ORDER — AMLODIPINE BESYLATE 10 MG/1
10 TABLET ORAL DAILY
Qty: 90 TABLET | Refills: 1 | Status: SHIPPED | OUTPATIENT
Start: 2024-01-02

## 2024-01-02 RX ORDER — ESCITALOPRAM OXALATE 20 MG/1
20 TABLET ORAL DAILY
Qty: 90 TABLET | Refills: 0 | Status: SHIPPED | OUTPATIENT
Start: 2024-01-02 | End: 2024-04-01

## 2024-01-02 NOTE — TELEPHONE ENCOUNTER
Hypertension Medications Protocol [id number]/02/2024 04:05 PM   Protocol Details CMP or BMP in past 12 months    Last serum creatinine< 2.0    Appointment in past 6 or next 3 months       Order per protocol

## 2024-01-03 ENCOUNTER — PATIENT MESSAGE (OUTPATIENT)
Dept: FAMILY MEDICINE CLINIC | Facility: CLINIC | Age: 64
End: 2024-01-03

## 2024-01-03 DIAGNOSIS — R92.8 FOLLOW-UP EXAMINATION OF ABNORMAL MAMMOGRAM: Primary | ICD-10-CM

## 2024-01-03 NOTE — TELEPHONE ENCOUNTER
From: Veronica Cameron  To: Leatha Wells  Sent: 1/3/2024 11:00 AM CST  Subject: Follow up after biopsy order    Hello. I received notification that a 6 month post biopsy should be scheduled. May I get an order for that please? Thank-you

## 2024-01-23 ENCOUNTER — TELEPHONE (OUTPATIENT)
Dept: FAMILY MEDICINE CLINIC | Facility: CLINIC | Age: 64
End: 2024-01-23

## 2024-01-23 PROBLEM — M65.332 TRIGGER MIDDLE FINGER OF LEFT HAND: Status: ACTIVE | Noted: 2023-11-02

## 2024-01-23 PROBLEM — G56.03 BILATERAL CARPAL TUNNEL SYNDROME: Status: ACTIVE | Noted: 2023-11-02

## 2024-01-23 PROBLEM — D12.2 BENIGN NEOPLASM OF ASCENDING COLON: Status: ACTIVE | Noted: 2024-01-23

## 2024-01-23 PROBLEM — Z12.11 SPECIAL SCREENING FOR MALIGNANT NEOPLASMS, COLON: Status: ACTIVE | Noted: 2024-01-23

## 2024-01-23 NOTE — TELEPHONE ENCOUNTER
Kena Garcia, RN  San[facility] Mcdonald Nurse  Repeat mammogram and US in 6 months    Letter mailed to patient reminding her she is due for the above per pt reminder.      Lab Frequency Next Occurrence   US BREAST LEFT COMPLETE (CPT=76641) Once 01/03/2024   [facility] TRACEY 2D+3D SCREENING AUGMT LEFT(17835-30/84670) Once 01/03/2024

## 2024-01-24 ENCOUNTER — TELEPHONE (OUTPATIENT)
Dept: FAMILY MEDICINE CLINIC | Facility: CLINIC | Age: 64
End: 2024-01-24

## 2024-01-24 NOTE — TELEPHONE ENCOUNTER
Mike Carrillo MD  P Mike Carrillo Nurse  Please check with pt that given the duration she has been taking antidepressive if she would like to wean off this medications. If so she will need to schedule a visit. Thank you        ----- Message -----  From: [email or web address] ([email or web address] [email or web address])  Sent: 1/24/2024   7:22 AM CST  To: Mike Carrillo MD ([facility])  Subject: Safety and Health Notification  _____________  Continuity of Care Document  01/24/2024  MAICO WEIR - Female; born [birth year][employer]: Health and Safety Notification, generated on Jan. 24, 2024January 24, 2024   Health and Safety Notification    [employer] works with your patients' plan sponsors to provide you with the enclosed [employer] safety and health considerations for patients in your practice. Please review the health information provided and make any changes in therapy that you believe are appropriate. [facility] understands that the information may not be applicable to every patient's therapy and therefore presents it as informational only.    Dosing Consideration: ESCITALOPRAM OXALATE Our claims record suggests that your older patient is receiving ESCITALOPRAM OXALATE at a higher dose. The recommended dose of escitalopram for most patients age 65 and over is 10 mg per day due to a potential for increased drug concentrations in this population; a risk of QT interval prolongation is possible. In clinical trials, use of the maximum recommended dose of 20 mg was associated with an approximate two-fold increase in adverse effects with no additional benefit over the 10 mg dose in the treatment of depression. Please consider the potential risks versus benefits for your patient and determine if changes in therapy are warranted

## 2024-01-25 ENCOUNTER — NURSE ONLY (OUTPATIENT)
Dept: FAMILY MEDICINE CLINIC | Facility: CLINIC | Age: 64
End: 2024-01-25
Payer: MEDICARE

## 2024-01-25 DIAGNOSIS — Z00.00 WELL ADULT EXAM: ICD-10-CM

## 2024-01-25 DIAGNOSIS — E55.9 HYPOVITAMINOSIS D: ICD-10-CM

## 2024-01-25 LAB
ALBUMIN SERPL-MCNC: 4.2 G/DL (ref 3.4–5)
ALBUMIN/GLOB SERPL: 1.3 {RATIO} (ref 1–2)
ALP LIVER SERPL-CCNC: 66 U/L
ANION GAP SERPL CALC-SCNC: 3 MMOL/L (ref 0–18)
AST SERPL-CCNC: 26 U/L (ref 15–37)
BASOPHILS # BLD AUTO: 0.05 X10(3) UL (ref 0–0.2)
BASOPHILS NFR BLD AUTO: 1.1 %
BILIRUB SERPL-MCNC: 0.5 MG/DL (ref 0.1–2)
BUN BLD-MCNC: 12 MG/DL (ref 9–23)
CALCIUM BLD-MCNC: 9.4 MG/DL (ref 8.5–10.1)
CHLORIDE SERPL-SCNC: 107 MMOL/L (ref 98–112)
CHOLEST SERPL-MCNC: 229 MG/DL (ref ?–200)
CO2 SERPL-SCNC: 27 MMOL/L (ref 21–32)
CREAT BLD-MCNC: 1.05 MG/DL
EGFRCR SERPLBLD CKD-EPI 2021: 59 ML/MIN/1.73M2 (ref 60–?)
EOSINOPHIL # BLD AUTO: 0.1 X10(3) UL (ref 0–0.7)
EOSINOPHIL NFR BLD AUTO: 2.1 %
ERYTHROCYTE [DISTWIDTH] IN BLOOD BY AUTOMATED COUNT: 12.5 %
FASTING PATIENT LIPID ANSWER: YES
FASTING STATUS PATIENT QL REPORTED: YES
GLOBULIN PLAS-MCNC: 3.2 G/DL (ref 2.8–4.4)
GLUCOSE BLD-MCNC: 96 MG/DL (ref 70–99)
HCT VFR BLD AUTO: 40.9 %
HDLC SERPL-MCNC: 92 MG/DL (ref 40–59)
HGB BLD-MCNC: 14.3 G/DL
IMM GRANULOCYTES # BLD AUTO: 0.01 X10(3) UL (ref 0–1)
IMM GRANULOCYTES NFR BLD: 0.2 %
LDLC SERPL CALC-MCNC: 129 MG/DL (ref ?–100)
LYMPHOCYTES # BLD AUTO: 2.1 X10(3) UL (ref 1–4)
LYMPHOCYTES NFR BLD AUTO: 44.3 %
MCH RBC QN AUTO: 32.4 PG (ref 26–34)
MCHC RBC AUTO-ENTMCNC: 35 G/DL (ref 31–37)
MCV RBC AUTO: 92.7 FL
MONOCYTES # BLD AUTO: 0.54 X10(3) UL (ref 0.1–1)
MONOCYTES NFR BLD AUTO: 11.4 %
NEUTROPHILS # BLD AUTO: 1.94 X10 (3) UL (ref 1.5–7.7)
NEUTROPHILS # BLD AUTO: 1.94 X10(3) UL (ref 1.5–7.7)
NEUTROPHILS NFR BLD AUTO: 40.9 %
NONHDLC SERPL-MCNC: 137 MG/DL (ref ?–130)
OSMOLALITY SERPL CALC.SUM OF ELEC: 284 MOSM/KG (ref 275–295)
PLATELET # BLD AUTO: 200 10(3)UL (ref 150–450)
POTASSIUM SERPL-SCNC: 4.2 MMOL/L (ref 3.5–5.1)
PROT SERPL-MCNC: 7.4 G/DL (ref 6.4–8.2)
RBC # BLD AUTO: 4.41 X10(6)UL
SODIUM SERPL-SCNC: 137 MMOL/L (ref 136–145)
T4 FREE SERPL-MCNC: 0.8 NG/DL (ref 0.8–1.7)
TRIGL SERPL-MCNC: 45 MG/DL (ref 30–149)
TSI SER-ACNC: 1.47 MIU/ML (ref 0.36–3.74)
VIT D+METAB SERPL-MCNC: 30 NG/ML (ref 30–100)
VLDLC SERPL CALC-MCNC: 8 MG/DL (ref 0–30)
WBC # BLD AUTO: 4.7 X10(3) UL (ref 4–11)

## 2024-01-25 PROCEDURE — 85025 COMPLETE CBC W/AUTO DIFF WBC: CPT | Performed by: FAMILY MEDICINE

## 2024-01-25 PROCEDURE — 82306 VITAMIN D 25 HYDROXY: CPT | Performed by: FAMILY MEDICINE

## 2024-01-25 PROCEDURE — 80053 COMPREHEN METABOLIC PANEL: CPT | Performed by: FAMILY MEDICINE

## 2024-01-25 PROCEDURE — 80061 LIPID PANEL: CPT | Performed by: FAMILY MEDICINE

## 2024-01-25 PROCEDURE — 84439 ASSAY OF FREE THYROXINE: CPT | Performed by: FAMILY MEDICINE

## 2024-01-25 PROCEDURE — 84443 ASSAY THYROID STIM HORMONE: CPT | Performed by: FAMILY MEDICINE

## 2024-01-25 NOTE — PROGRESS NOTES
Patient to clinic for labs per cande Ackerman and lavendr tube drawn left AC x 1 attempt    Patient left office in stable condition

## 2024-01-30 ENCOUNTER — OFFICE VISIT (OUTPATIENT)
Dept: FAMILY MEDICINE CLINIC | Facility: CLINIC | Age: 64
End: 2024-01-30
Payer: MEDICARE

## 2024-01-30 VITALS
BODY MASS INDEX: 28.76 KG/M2 | DIASTOLIC BLOOD PRESSURE: 70 MMHG | HEIGHT: 63.5 IN | WEIGHT: 164.38 LBS | HEART RATE: 72 BPM | SYSTOLIC BLOOD PRESSURE: 128 MMHG | TEMPERATURE: 97 F | OXYGEN SATURATION: 96 %

## 2024-01-30 DIAGNOSIS — F41.8 SITUATIONAL ANXIETY: ICD-10-CM

## 2024-01-30 DIAGNOSIS — E78.2 MIXED HYPERLIPIDEMIA: ICD-10-CM

## 2024-01-30 DIAGNOSIS — Z78.0 POST-MENOPAUSAL: ICD-10-CM

## 2024-01-30 DIAGNOSIS — Z23 NEED FOR PNEUMOCOCCAL 20-VALENT CONJUGATE VACCINATION: ICD-10-CM

## 2024-01-30 DIAGNOSIS — Z00.00 MEDICARE ANNUAL WELLNESS VISIT, INITIAL: Primary | ICD-10-CM

## 2024-01-30 DIAGNOSIS — Z01.419 ENCOUNTER FOR GYNECOLOGICAL EXAMINATION WITHOUT ABNORMAL FINDING: ICD-10-CM

## 2024-01-30 DIAGNOSIS — R79.89 ELEVATED SERUM CREATININE: ICD-10-CM

## 2024-01-30 LAB
ALBUMIN SERPL-MCNC: 4.2 G/DL (ref 3.4–5)
ALBUMIN/GLOB SERPL: 1.1 {RATIO} (ref 1–2)
ALP LIVER SERPL-CCNC: 69 U/L
ANION GAP SERPL CALC-SCNC: 7 MMOL/L (ref 0–18)
AST SERPL-CCNC: 31 U/L (ref 15–37)
BILIRUB SERPL-MCNC: 0.2 MG/DL (ref 0.1–2)
BUN BLD-MCNC: 23 MG/DL (ref 9–23)
CALCIUM BLD-MCNC: 10 MG/DL (ref 8.5–10.1)
CHLORIDE SERPL-SCNC: 105 MMOL/L (ref 98–112)
CO2 SERPL-SCNC: 28 MMOL/L (ref 21–32)
CREAT BLD-MCNC: 0.95 MG/DL
EGFRCR SERPLBLD CKD-EPI 2021: 66 ML/MIN/1.73M2 (ref 60–?)
FASTING STATUS PATIENT QL REPORTED: NO
GLOBULIN PLAS-MCNC: 3.7 G/DL (ref 2.8–4.4)
GLUCOSE BLD-MCNC: 115 MG/DL (ref 70–99)
OSMOLALITY SERPL CALC.SUM OF ELEC: 295 MOSM/KG (ref 275–295)
POTASSIUM SERPL-SCNC: 5.3 MMOL/L (ref 3.5–5.1)
PROT SERPL-MCNC: 7.9 G/DL (ref 6.4–8.2)
SODIUM SERPL-SCNC: 140 MMOL/L (ref 136–145)

## 2024-01-30 PROCEDURE — G0402 INITIAL PREVENTIVE EXAM: HCPCS | Performed by: FAMILY MEDICINE

## 2024-01-30 PROCEDURE — 80053 COMPREHEN METABOLIC PANEL: CPT | Performed by: FAMILY MEDICINE

## 2024-01-30 PROCEDURE — 87624 HPV HI-RISK TYP POOLED RSLT: CPT | Performed by: FAMILY MEDICINE

## 2024-01-30 PROCEDURE — 88175 CYTOPATH C/V AUTO FLUID REDO: CPT | Performed by: FAMILY MEDICINE

## 2024-01-30 RX ORDER — ESCITALOPRAM OXALATE 5 MG/1
TABLET ORAL
Qty: 30 TABLET | Refills: 0 | Status: SHIPPED | OUTPATIENT
Start: 2024-01-30

## 2024-01-30 RX ORDER — ESCITALOPRAM OXALATE 10 MG/1
TABLET ORAL
Qty: 30 TABLET | Refills: 0 | Status: SHIPPED | OUTPATIENT
Start: 2024-01-30

## 2024-01-30 NOTE — PROGRESS NOTES
Subjective:   Veronica Cameron is a 63 year old female who presents for a Medicare Initial Preventative Physical Exam (Welcome to Medicare- < 12 months on Medicare) and scheduled follow up of multiple significant but stable problems.   Scheduled to have follow up mammogram for breast anomalies and declines exam. Had colonoscopy last week and agreeable to have Pap today. Pt had bone density and is agreeable to getting prevnar today    She would like to wean off lexapro which she took after retiring due to situational anxiety of care home    Pt informed of elevated serum creating and advised to repeat study again ifs till abn will have her see nephrology    History/Other:   Fall Risk Assessment:   She has been screened for Falls and is low risk.      Cognitive Assessment:   She had a completely normal cognitive assessment - see flowsheet entries     Functional Ability/Status:   Veronica Cameron has some abnormal functions as listed below:  She has Vision problems based on screening of functional status.       Depression Screening (PHQ-2/PHQ-9): PHQ-2 SCORE: 0  , done 1/30/2024   If you checked off any problems, how difficult have these problems made it for you to do your work, take care of things at home, or get along with other people?: Not difficult at all              Advanced Directives:   She does NOT have a Living Will. [Do you have a living will?: No]  She does NOT have a Power of  for Health Care. [Do you have a healthcare power of ?: No]  Discussed Advance Care Planning with patient (and family/surrogate if present). Standard forms made available to patient in After Visit Summary.      Patient Active Problem List   Diagnosis    Bee sting allergy    Chronic back pain    Seronegative rheumatoid arthritis (HCC)    Essential hypertension, benign    Special screening for malignant neoplasms, colon    Benign neoplasm of ascending colon    Bilateral carpal tunnel syndrome    Trigger middle finger of left  hand     Allergies:  She is allergic to bee venom, pcn [penicillins], and other.    Current Medications:  Outpatient Medications Marked as Taking for the 1/30/24 encounter (Office Visit) with Mike Carrillo MD   Medication Sig    escitalopram (LEXAPRO) 5 MG Oral Tab Take 17.5mg daily for one week then 15mg daily for one week then 12.5mg daily for one week then 10mg daily for one week then 7.5mg for one week then 5mg for one week and finally 2.5mg for one week then stop    escitalopram (LEXAPRO) 10 MG Oral Tab Take 17.5mg daily for one week then 15mg daily for one week then 12.5mg daily for one week then 10mg daily for one week then 7.5mg for one week then 5mg for one week and finally 2.5mg for one week then stop    amLODIPine 10 MG Oral Tab Take 1 tablet (10 mg total) by mouth daily.    PEG 3350-KCl-Na Bicarb-NaCl 420 g Oral Recon Soln Take as directed by physician.    metoprolol succinate ER 50 MG Oral Tablet 24 Hr Take 1 tablet (50 mg total) by mouth daily.    spironolactone 50 MG Oral Tab Take 1 tablet (50 mg total) by mouth daily.    acetaZOLAMIDE 125 MG Oral Tab Take 125 mg PO twice daily; Start day before travel. May be discontinued after staying at the same elevation for 2 to 4 days or if descent is initiated    HYDROCHLOROTHIAZIDE 25 MG Oral Tab TAKE ONE TABLET BY MOUTH DAILY    Turmeric 400 MG Oral Cap Take by mouth.    EPINEPHrine 0.3 MG/0.3ML Injection Solution Auto-injector Inject 0.3 mL (1 each total) into the muscle as needed.    HYDROXYCHLOROQUINE 200 MG Oral Tab TAKE TWO TABLETS BY MOUTH DAILY    SULFASALAZINE 500 MG Oral Tab TAKE TWO TABLETS BY MOUTH DAILY    Multiple Vitamins-Minerals (OCUVITE EXTRA) Oral Tab Take by mouth.    Fish Oil-Cholecalciferol (FISH OIL + D3 OR) Take  by mouth.       Medical History:  She  has a past medical history of Arthritis, Bee sting allergy, Chronic back pain, Decorative tattoo, Osteoarthrosis, unspecified whether generalized or localized, unspecified site,  Osteoporosis, Pain in joints, Unspecified essential hypertension, and Wears glasses.  Surgical History:  She  has a past surgical history that includes lela biopsy stereo nodule 2 site bilat (cpt=19081/33133) (2017); lela biopsy stereo nodule 1 site left (cpt=19081) (2017); and colonoscopy.   Family History:  Her family history includes Cancer in her father and paternal grandmother; Hypertension in her son; Other in her mother; Ovarian Cancer (age of onset: 86) in her paternal grandmother.  Social History:  She  reports that she quit smoking about 44 years ago. Her smoking use included cigarettes. She has never used smokeless tobacco. She reports current alcohol use of about 7.0 standard drinks of alcohol per week. She reports that she does not use drugs.    Tobacco:  She smoked tobacco in the past but quit greater than 12 months ago.  Social History    Tobacco Use      Smoking status: Former        Years: 2        Types: Cigarettes        Quit date: 1980        Years since quittin.1      Smokeless tobacco: Never       CAGE Alcohol Screen:   CAGE screening score of 0 on 2024, showing low risk of alcohol abuse.      Patient Care Team:  Mike Carrillo MD as PCP - General (Family Medicine)  Eric Lopez PT as Physical Therapist    Review of Systems   As per HPI and all other systems reviewed and are negative      Objective:   Physical Exam  General Appearance:  Alert, cooperative, no distress, appears stated age   Head:  Normocephalic, without obvious abnormality, atraumatic   Eyes:  PERRL, conjunctiva/corneas clear, EOM's intact both eyes   Ears:  Normal TM's and external ear canals, both ears   Nose: Nares normal, septum midline,mucosa normal, no drainage or sinus tenderness   Throat: Lips, mucosa, and tongue normal; teeth and gums normal   Neck: Supple, symmetrical, trachea midline, no adenopathy;  thyroid: not enlarged, symmetric, no tenderness/mass/nodules; no carotid bruit or JVD    Back:   Symmetric, no curvature, ROM normal, no CVA tenderness   Lungs:   Clear to auscultation bilaterally, respirations unlabored   Heart:  Regular rate and rhythm, S1 and S2 normal, no murmur, rub, or gallop   Abdomen:   Soft, non-tender, bowel sounds active all four quadrants,  no masses, no organomegaly   Pelvic: Deferred   Extremities: Extremities normal, atraumatic, no cyanosis or edema   Pulses: 2+ and symmetric   Skin: Skin color, texture, turgor normal, no rashes or lesions   Lymph nodes: Cervical, supraclavicular, and axillary nodes normal   Neurologic: Normal    and Pelvic: external genitalia normal, no adnexal masses or tenderness, no cervical motion tenderness, rectovaginal septum normal, urethra without abnormality or discharge, uterus normal size, shape, and consistency, vagina normal without discharge, and cervical os is tight and unable to introduce brush into os.     /70   Pulse 72   Temp 97 °F (36.1 °C) (Temporal)   Ht 5' 3.5\" (1.613 m)   Wt 164 lb 6.4 oz (74.6 kg)   SpO2 96%   BMI 28.67 kg/m²  Estimated body mass index is 28.67 kg/m² as calculated from the following:    Height as of this encounter: 5' 3.5\" (1.613 m).    Weight as of this encounter: 164 lb 6.4 oz (74.6 kg).    Medicare Hearing Assessment:   Hearing Screening    Screening Method: Whisper Test  Whisper Test Result: Pass               Visual Acuity:   Right Eye Visual Acuity: Corrected Right Eye Chart Acuity: 20/25   Left Eye Visual Acuity: Corrected Left Eye Chart Acuity: 20/25   Both Eyes Visual Acuity: Corrected Both Eyes Chart Acuity: 20/20   Able To Tolerate Visual Acuity: Yes        Assessment & Plan:   Veronica Cameron is a 63 year old female who presents for a Medicare Assessment.     1. Medicare annual wellness visit, initial (Primary)  -     ThinPrep PAP Smear B; Future; Expected date: 01/30/2024  -     Hpv High Risk , Thin Prep Collect; Future; Expected date: 01/30/2024  -     ThinPrep PAP Smear B  -     Hpv  High Risk , Thin Prep Collect  -     Image-Guided Pap Smear ([facility])  2. Encounter for gynecological examination without abnormal finding  -     ThinPrep PAP Smear B; Future; Expected date: 01/30/2024  -     Hpv High Risk , Thin Prep Collect; Future; Expected date: 01/30/2024  -     ThinPrep PAP Smear B  -     Hpv High Risk , Thin Prep Collect  -     Image-Guided Pap Smear ([facility])  3. Post-menopausal  4. Mixed hyperlipidemia  5. Elevated serum creatinine  -     Comp Metabolic Panel (14); Future; Expected date: 01/30/2024  -     Comp Metabolic Panel (14)  6. Need for pneumococcal 20-valent conjugate vaccination  -     PCV20 (Prevnar 20)  7. Situational anxiety  -     Escitalopram Oxalate; Take 17.5mg daily for one week then 15mg daily for one week then 12.5mg daily for one week then 10mg daily for one week then 7.5mg for one week then 5mg for one week and finally 2.5mg for one week then stop  Dispense: 30 tablet; Refill: 0  -     Escitalopram Oxalate; Take 17.5mg daily for one week then 15mg daily for one week then 12.5mg daily for one week then 10mg daily for one week then 7.5mg for one week then 5mg for one week and finally 2.5mg for one week then stop  Dispense: 30 tablet; Refill: 0    The patient indicates understanding of these issues and agrees to the plan.  Further testing ordered.  Imaging studies ordered.  Lab work ordered.  Reinforced healthy diet, lifestyle, and exercise.      No follow-ups on file.     Mike Carrillo MD, 1/30/2024     Supplementary Documentation:   General Health:  In the past six months, have you lost more than 10 pounds without trying?: 2 - No  Has your appetite been poor?: No  Type of Diet: Balanced  How does the patient maintain a good energy level?: Appropriate Exercise;Stretching  How would you describe your daily physical activity?: Moderate  How would you describe your current health state?: Good  How do you maintain positive mental well-being?: Social  Interaction;Puzzles;Games;Visiting Friends;Visiting Family  On a scale of 0 to 10, with 0 being no pain and 10 being severe pain, what is your pain level?: 2 - (Mild)  In the past six months, have you experienced urine leakage?: 0-No  At any time do you feel concerned for the safety/well-being of yourself and/or your children, in your home or elsewhere?: No  Have you had any immunizations at another office such as Influenza, Hepatitis B, Tetanus, or Pneumococcal?: Yes       Veronica Cameron's SCREENING SCHEDULE   Tests on this list are recommended by your physician but may not be covered, or covered at this frequency, by your insurer.   Please check with your insurance carrier before scheduling to verify coverage.   PREVENTATIVE SERVICES FREQUENCY &  COVERAGE DETAILS LAST COMPLETION DATE   Diabetes Screening    Fasting Blood Sugar /  Glucose    One screening every 12 months if never tested or if previously tested but not diagnosed with pre-diabetes   One screening every 6 months if diagnosed with pre-diabetes Lab Results   Component Value Date    GLUCOSE 99 06/20/2016     (H) 01/30/2024        Cardiovascular Disease Screening    Lipid Panel  Cholesterol  Lipoprotein (HDL)  Triglycerides Covered every 5 years for all Medicare beneficiaries without apparent signs or symptoms of cardiovascular disease Lab Results   Component Value Date    CHOLEST 229 (H) 01/25/2024    HDL 92 (H) 01/25/2024     (H) 01/25/2024    TRIG 45 01/25/2024         Electrocardiogram (EKG)   Covered if needed at Welcome to Medicare, and non-screening if indicated for medical reasons -      Ultrasound Screening for Abdominal Aortic Aneurysm (AAA) Covered once in a lifetime for one of the following risk factors    Men who are 65-75 years old and have ever smoked    Anyone with a family history -     Colorectal Cancer Screening  Covered for ages 50-85; only need ONE of the following:    Colonoscopy   Covered every 10 years    Covered every  2 years if patient is at high risk or previous colonoscopy was abnormal 01/23/2024    Health Maintenance   Topic Date Due    Colorectal Cancer Screening  01/23/2029       Flexible Sigmoidoscopy   Covered every 4 years -    Fecal Occult Blood Test Covered annually -   Bone Density Screening    Bone density screening    Covered every 2 years after age 65 if diagnosed with risk of osteoporosis or estrogen deficiency.    Covered yearly for long-term glucocorticoid medication use (Steroids) No results found for this or any previous visit.      No recommendations at this time   Pap and Pelvic    Pap   Covered every 2 years for women at normal risk; Annually if at high risk 01/30/2024  Health Maintenance   Topic Date Due    Pap Smear  02/02/2024       Chlamydia Annually if high risk -  No recommendations at this time   Screening Mammogram    Mammogram     Recommend annually for all female patients aged 40 and older    One baseline mammogram covered for patients aged 35-39 07/21/2023    Health Maintenance   Topic Date Due    Mammogram  07/07/2024       Immunizations    Influenza Covered once per flu season  Please get every year 11/09/2023  No recommendations at this time    Pneumococcal Each vaccine (Gacuoas95 & [id number]) covered once after 65 Prevnar 13: -    [id number]: -     Pneumococcal Vaccination(1 of 1 - PCV) Never done    Hepatitis B One screening covered for patients with certain risk factors   -  No recommendations at this time    Tetanus Toxoid Not covered by Medicare Part B unless medically necessary (cut with metal); may be covered with your pharmacy prescription benefits -    Tetanus, Diptheria and Pertusis TD and TDaP Not covered by Medicare Part B -  No recommendations at this time    Zoster Not covered by Medicare Part B; may be covered with your pharmacy  prescription benefits -  No recommendations at this time     Annual Monitoring of Persistent Medications (ACE/ARB, digoxin diuretics,  anticonvulsants)    Potassium Annually Lab Results   Component Value Date    K 5.3 (H) 01/30/2024         Creatinine   Annually Lab Results   Component Value Date    CREATSERUM 0.95 01/30/2024         BUN Annually Lab Results   Component Value Date    BUN 23 01/30/2024       Drug Serum Conc Annually No results found for: \"DIGOXIN\", \"DIG\", \"VALP\"

## 2024-01-31 ENCOUNTER — TELEPHONE (OUTPATIENT)
Dept: FAMILY MEDICINE CLINIC | Facility: CLINIC | Age: 64
End: 2024-01-31

## 2024-01-31 DIAGNOSIS — Z98.82 HISTORY OF BREAST IMPLANT: Primary | ICD-10-CM

## 2024-01-31 DIAGNOSIS — R92.8 FOLLOW-UP EXAMINATION OF ABNORMAL MAMMOGRAM: ICD-10-CM

## 2024-01-31 LAB — HPV I/H RISK 1 DNA SPEC QL NAA+PROBE: NEGATIVE

## 2024-01-31 NOTE — TELEPHONE ENCOUNTER
Mammogram order placed    Left detailed message to Maria L (central scheduling) that mammogram order placed and to call office if she needs anything else

## 2024-01-31 NOTE — TELEPHONE ENCOUNTER
Letter mailed to patient reminding them they have outstanding orders.  Lab Frequency Next Occurrence     PVC20 (Prevnar 20) Vaccine

## 2024-02-04 LAB
.: NORMAL
.: NORMAL

## 2024-02-07 ENCOUNTER — OFFICE VISIT (OUTPATIENT)
Facility: LOCATION | Age: 64
End: 2024-02-07
Payer: MEDICARE

## 2024-02-07 VITALS — TEMPERATURE: 98 F | HEART RATE: 70 BPM

## 2024-02-07 DIAGNOSIS — L72.3 SEBACEOUS CYST: Primary | ICD-10-CM

## 2024-02-07 PROCEDURE — 99213 OFFICE O/P EST LOW 20 MIN: CPT | Performed by: SURGERY

## 2024-02-07 NOTE — PROGRESS NOTES
[facility]  Progress Note    Veronica Cameron Patient Status:  No patient class for patient encounter    1960 MRN [id number]   Location [facility] Attending No att. providers found   Hosp Day # 0 PCP Mike Carrillo MD     Subjective:  Patient presents status post infected scalp cyst.  States the mass is decreased in size it is less painful since our last visit.    Objective/Physical Exam:    [unfilled]    General: Alert, orientated x3.  Cooperative.  No apparent distress.  Vital Signs:  Pulse 70, temperature 98.2 °F (36.8 °C), temperature source Temporal.  HEENT: Exam is unremarkable.  Without scleral icterus.  Mucous membranes are moist. Oropharynx is clear.  Neck: No tenderness to palpitation.  No JVD. Supple.   Lungs: Clear to auscultation bilaterally.  Cardiac: Regular rate and rhythm. No murmur.  Abdomen:  Soft, non-distended, non-tender, with no rebound or guarding.  No peritoneal signs. No ascites.  Liver is within normal limits.  Spleen is not palpable   Extremities:  No lower extremity edema noted.  Without clubbing or cyanosis.        Labs:             Assessment/Plan:  Patient Active Problem List   Diagnosis    Bee sting allergy    Chronic back pain    Seronegative rheumatoid arthritis (HCC)    Essential hypertension, benign    Special screening for malignant neoplasms, colon    Benign neoplasm of ascending colon    Bilateral carpal tunnel syndrome    Trigger middle finger of left hand       Impression: Scalp cyst with decreased inflammation decreased erythema decreased induration cyst is approximately 7 mm in size.  Mildly tender to palpation.  It appears as though the infection is resolving.  Plan is now to excise the scalp cyst discussed with patient risks of the procedure to include bleeding and infection all questions answered.  I spent 33  minutes face to face with the patient. More than 50% of that time was spent counseling the patient  and/or on coordination of care. The diagnosis, prognosis, and general treatment was explained to the patient and the family.        DO TONI Marques General Surgery  2/7/2024  5:06 PM

## 2024-02-14 ENCOUNTER — HOSPITAL ENCOUNTER (OUTPATIENT)
Dept: MAMMOGRAPHY | Facility: HOSPITAL | Age: 64
Discharge: HOME | End: 2024-02-14
Attending: FAMILY MEDICINE
Payer: MEDICARE

## 2024-02-14 DIAGNOSIS — Z98.82 HISTORY OF BREAST IMPLANT: ICD-10-CM

## 2024-02-14 DIAGNOSIS — R92.8 FOLLOW-UP EXAMINATION OF ABNORMAL MAMMOGRAM: ICD-10-CM

## 2024-02-14 PROCEDURE — 77066 DX MAMMO INCL CAD BI: CPT | Performed by: FAMILY MEDICINE

## 2024-02-14 PROCEDURE — 77062 BREAST TOMOSYNTHESIS BI: CPT | Performed by: FAMILY MEDICINE

## 2024-02-14 PROCEDURE — 76642 ULTRASOUND BREAST LIMITED: CPT | Performed by: FAMILY MEDICINE

## 2024-02-14 RX ORDER — HYDROCHLOROTHIAZIDE 25 MG/1
25 TABLET ORAL DAILY
Qty: 90 TABLET | Refills: 3 | Status: SHIPPED | OUTPATIENT
Start: 2024-02-14

## 2024-02-14 NOTE — TELEPHONE ENCOUNTER
Hypertension Medications Protocol [id number]/14/2024 02:55 PM   Protocol Details CMP or BMP in past 12 months    Last BP reading less than 140/90    In person appointment or virtual visit in the past 12 mos or appointment in next 3 mos    EGFRCR or GFRNAA > 50     Future Appointments   Date Time Provider Department Center   3/5/2024  1:00 PM [facility] DO [id number]     Order per protocol

## 2024-02-28 ENCOUNTER — EKG ENCOUNTER (OUTPATIENT)
Dept: LAB | Facility: HOSPITAL | Age: 64
End: 2024-02-28
Payer: MEDICARE

## 2024-02-28 DIAGNOSIS — G56.01 CARPAL TUNNEL SYNDROME OF RIGHT WRIST: ICD-10-CM

## 2024-02-28 LAB
ATRIAL RATE: 53 BPM
P AXIS: 43 DEGREES
P-R INTERVAL: 166 MS
Q-T INTERVAL: 452 MS
QRS DURATION: 92 MS
QTC CALCULATION (BEZET): 424 MS
R AXIS: 3 DEGREES
T AXIS: 29 DEGREES
VENTRICULAR RATE: 53 BPM

## 2024-02-28 PROCEDURE — 93010 ELECTROCARDIOGRAM REPORT: CPT | Performed by: INTERNAL MEDICINE

## 2024-02-28 PROCEDURE — 93005 ELECTROCARDIOGRAM TRACING: CPT

## 2024-03-04 ENCOUNTER — ANESTHESIA EVENT (OUTPATIENT)
Dept: SURGERY | Facility: HOSPITAL | Age: 64
End: 2024-03-04
Payer: MEDICARE

## 2024-03-05 ENCOUNTER — ANESTHESIA (OUTPATIENT)
Dept: SURGERY | Facility: HOSPITAL | Age: 64
End: 2024-03-05
Payer: MEDICARE

## 2024-03-05 ENCOUNTER — HOSPITAL ENCOUNTER (OUTPATIENT)
Facility: HOSPITAL | Age: 64
Setting detail: HOSPITAL OUTPATIENT SURGERY
Discharge: HOME | End: 2024-03-05
Attending: STUDENT IN AN ORGANIZED HEALTH CARE EDUCATION/TRAINING PROGRAM | Admitting: STUDENT IN AN ORGANIZED HEALTH CARE EDUCATION/TRAINING PROGRAM
Payer: MEDICARE

## 2024-03-05 VITALS
TEMPERATURE: 97 F | OXYGEN SATURATION: 98 % | RESPIRATION RATE: 18 BRPM | BODY MASS INDEX: 28 KG/M2 | DIASTOLIC BLOOD PRESSURE: 76 MMHG | HEIGHT: 63.5 IN | HEART RATE: 50 BPM | SYSTOLIC BLOOD PRESSURE: 126 MMHG | WEIGHT: 160 LBS

## 2024-03-05 DIAGNOSIS — G56.03 BILATERAL CARPAL TUNNEL SYNDROME: ICD-10-CM

## 2024-03-05 DIAGNOSIS — G56.01 CARPAL TUNNEL SYNDROME OF RIGHT WRIST: Primary | ICD-10-CM

## 2024-03-05 PROCEDURE — 01N50ZZ RELEASE MEDIAN NERVE, OPEN APPROACH: ICD-10-PCS | Performed by: STUDENT IN AN ORGANIZED HEALTH CARE EDUCATION/TRAINING PROGRAM

## 2024-03-05 RX ORDER — HYDROMORPHONE HYDROCHLORIDE 1 MG/ML
0.6 INJECTION, SOLUTION INTRAMUSCULAR; INTRAVENOUS; SUBCUTANEOUS EVERY 5 MIN PRN
Status: DISCONTINUED | OUTPATIENT
Start: 2024-03-05 | End: 2024-03-05

## 2024-03-05 RX ORDER — LIDOCAINE HYDROCHLORIDE 10 MG/ML
INJECTION, SOLUTION INFILTRATION; PERINEURAL AS NEEDED
Status: DISCONTINUED | OUTPATIENT
Start: 2024-03-05 | End: 2024-03-05 | Stop reason: HOSPADM

## 2024-03-05 RX ORDER — HYDROMORPHONE HYDROCHLORIDE 1 MG/ML
0.4 INJECTION, SOLUTION INTRAMUSCULAR; INTRAVENOUS; SUBCUTANEOUS EVERY 5 MIN PRN
Status: DISCONTINUED | OUTPATIENT
Start: 2024-03-05 | End: 2024-03-05

## 2024-03-05 RX ORDER — NALOXONE HYDROCHLORIDE 0.4 MG/ML
0.08 INJECTION, SOLUTION INTRAMUSCULAR; INTRAVENOUS; SUBCUTANEOUS AS NEEDED
Status: DISCONTINUED | OUTPATIENT
Start: 2024-03-05 | End: 2024-03-05

## 2024-03-05 RX ORDER — ACETAMINOPHEN 500 MG
1000 TABLET ORAL ONCE AS NEEDED
Status: DISCONTINUED | OUTPATIENT
Start: 2024-03-05 | End: 2024-03-05

## 2024-03-05 RX ORDER — SCOLOPAMINE TRANSDERMAL SYSTEM 1 MG/1
1 PATCH, EXTENDED RELEASE TRANSDERMAL ONCE
Status: DISCONTINUED | OUTPATIENT
Start: 2024-03-05 | End: 2024-03-05 | Stop reason: HOSPADM

## 2024-03-05 RX ORDER — KETOROLAC TROMETHAMINE 30 MG/ML
INJECTION, SOLUTION INTRAMUSCULAR; INTRAVENOUS AS NEEDED
Status: DISCONTINUED | OUTPATIENT
Start: 2024-03-05 | End: 2024-03-05 | Stop reason: SURG

## 2024-03-05 RX ORDER — LIDOCAINE HYDROCHLORIDE 10 MG/ML
INJECTION, SOLUTION EPIDURAL; INFILTRATION; INTRACAUDAL; PERINEURAL AS NEEDED
Status: DISCONTINUED | OUTPATIENT
Start: 2024-03-05 | End: 2024-03-05 | Stop reason: SURG

## 2024-03-05 RX ORDER — SODIUM CHLORIDE, SODIUM LACTATE, POTASSIUM CHLORIDE, CALCIUM CHLORIDE 600; 310; 30; 20 MG/100ML; MG/100ML; MG/100ML; MG/100ML
INJECTION, SOLUTION INTRAVENOUS CONTINUOUS
Status: DISCONTINUED | OUTPATIENT
Start: 2024-03-05 | End: 2024-03-05

## 2024-03-05 RX ORDER — ONDANSETRON 2 MG/ML
4 INJECTION INTRAMUSCULAR; INTRAVENOUS EVERY 6 HOURS PRN
Status: DISCONTINUED | OUTPATIENT
Start: 2024-03-05 | End: 2024-03-05

## 2024-03-05 RX ORDER — ACETAMINOPHEN 500 MG
1000 TABLET ORAL ONCE
Status: DISCONTINUED | OUTPATIENT
Start: 2024-03-05 | End: 2024-03-05 | Stop reason: HOSPADM

## 2024-03-05 RX ORDER — CEFAZOLIN SODIUM/WATER 2 G/20 ML
2 SYRINGE (ML) INTRAVENOUS ONCE
Status: DISCONTINUED | OUTPATIENT
Start: 2024-03-05 | End: 2024-03-05 | Stop reason: HOSPADM

## 2024-03-05 RX ORDER — HYDROCODONE BITARTRATE AND ACETAMINOPHEN 5; 325 MG/1; MG/1
2 TABLET ORAL ONCE AS NEEDED
Status: DISCONTINUED | OUTPATIENT
Start: 2024-03-05 | End: 2024-03-05

## 2024-03-05 RX ORDER — METOCLOPRAMIDE HYDROCHLORIDE 5 MG/ML
10 INJECTION INTRAMUSCULAR; INTRAVENOUS EVERY 8 HOURS PRN
Status: DISCONTINUED | OUTPATIENT
Start: 2024-03-05 | End: 2024-03-05

## 2024-03-05 RX ORDER — TRAMADOL HYDROCHLORIDE 50 MG/1
TABLET ORAL EVERY 6 HOURS PRN
Qty: 5 TABLET | Refills: 0 | Status: SHIPPED | OUTPATIENT
Start: 2024-03-05

## 2024-03-05 RX ORDER — HYDROCODONE BITARTRATE AND ACETAMINOPHEN 5; 325 MG/1; MG/1
1 TABLET ORAL ONCE AS NEEDED
Status: DISCONTINUED | OUTPATIENT
Start: 2024-03-05 | End: 2024-03-05

## 2024-03-05 RX ORDER — ONDANSETRON 2 MG/ML
INJECTION INTRAMUSCULAR; INTRAVENOUS AS NEEDED
Status: DISCONTINUED | OUTPATIENT
Start: 2024-03-05 | End: 2024-03-05 | Stop reason: SURG

## 2024-03-05 RX ORDER — DEXAMETHASONE SODIUM PHOSPHATE 4 MG/ML
VIAL (ML) INJECTION AS NEEDED
Status: DISCONTINUED | OUTPATIENT
Start: 2024-03-05 | End: 2024-03-05 | Stop reason: SURG

## 2024-03-05 RX ORDER — HYDROMORPHONE HYDROCHLORIDE 1 MG/ML
0.2 INJECTION, SOLUTION INTRAMUSCULAR; INTRAVENOUS; SUBCUTANEOUS EVERY 5 MIN PRN
Status: DISCONTINUED | OUTPATIENT
Start: 2024-03-05 | End: 2024-03-05

## 2024-03-05 RX ORDER — BUPIVACAINE HYDROCHLORIDE 5 MG/ML
INJECTION, SOLUTION EPIDURAL; INTRACAUDAL AS NEEDED
Status: DISCONTINUED | OUTPATIENT
Start: 2024-03-05 | End: 2024-03-05 | Stop reason: HOSPADM

## 2024-03-05 RX ADMIN — SODIUM CHLORIDE, SODIUM LACTATE, POTASSIUM CHLORIDE, CALCIUM CHLORIDE: 600; 310; 30; 20 INJECTION, SOLUTION INTRAVENOUS at 08:01:00

## 2024-03-05 RX ADMIN — LIDOCAINE HYDROCHLORIDE 50 MG: 10 INJECTION, SOLUTION EPIDURAL; INFILTRATION; INTRACAUDAL; PERINEURAL at 08:04:00

## 2024-03-05 RX ADMIN — SODIUM CHLORIDE, SODIUM LACTATE, POTASSIUM CHLORIDE, CALCIUM CHLORIDE: 600; 310; 30; 20 INJECTION, SOLUTION INTRAVENOUS at 08:24:00

## 2024-03-05 RX ADMIN — DEXAMETHASONE SODIUM PHOSPHATE 4 MG: 4 MG/ML VIAL (ML) INJECTION at 08:08:00

## 2024-03-05 RX ADMIN — ONDANSETRON 4 MG: 2 INJECTION INTRAMUSCULAR; INTRAVENOUS at 08:08:00

## 2024-03-05 RX ADMIN — KETOROLAC TROMETHAMINE 30 MG: 30 INJECTION, SOLUTION INTRAMUSCULAR; INTRAVENOUS at 08:08:00

## 2024-03-05 NOTE — ANESTHESIA POSTPROCEDURE EVALUATION
[facility]    Veronica Cameron Patient Status:  Hospital Outpatient Surgery   Age/Gender 63 year old female MRN [id number]   Location [facility] Attending Elsie Ortez MD   Hosp Day # 0 PCP Mike Carrillo MD       Anesthesia Post-op Note    RIGHT OPEN CARPAL TUNNEL RELEASE    Procedure Summary       Date: 03/05/24 Room / Location:  MAIN OR  /  MAIN OR    Anesthesia Start: 0800 Anesthesia Stop: 0824    Procedure: RIGHT OPEN CARPAL TUNNEL RELEASE (Right: Wrist) Diagnosis: (CARPAL TUNNEL SYNDROME RIGHT WRIST)    Surgeons: Elsie Ortez MD Anesthesiologist: Jaimie Ibarra MD    Anesthesia Type: MAC ASA Status: 2            Anesthesia Type: MAC    Vitals Value Taken Time   /71 03/05/24 0825   Temp 96.8 03/05/24 0825   Pulse 47 03/05/24 0825   Resp 14 03/05/24 0825   SpO2 94 03/05/24 0825       Patient Location: [facility] Day Surgery    Anesthesia Type: MAC    Airway Patency: patent    Postop Pain Control: adequate    Mental Status: preanesthetic baseline    Nausea/Vomiting: none    Cardiopulmonary/Hydration status: stable euvolemic    Complications: no apparent anesthesia related complications    Postop vital signs: stable    Dental Exam: Unchanged from Preop

## 2024-03-05 NOTE — H&P
I have reviewed the below HP and there have been the following changes: patient now wishes to proceed with open release instead of endoscopic.     ORTHOPEDIC SURGERY CLINIC RETURN NOTE     Patient Name: Veronica Cameron  Date of Appointment: 2/22/2024  Diagnosis: Bilateral carpal tunnel syndrome, left middle trigger finger  Patient presents with:  Right Hand - Pain  Left Hand - Pain    History of Present Illness:  The patient is a 63 year old year old female who presents for follow up today. She had injections of both her carpal tunnels and her left middle finger when I last saw her on 11/2/24. She had excellent relief of her symptoms particularly on her left side until recently when her symptoms returned. She has burning and tingling in the hands, again in the radial digits and symptoms are worse at night. She has been wearing her braces. She is having locking/catching of the middle finger on the left again.     PHYSICAL EXAM     Estimated body mass index is 27.98 kg/m² as calculated from the following:  Height as of 2/7/24: 5' 4\" (1.626 m).  Weight as of 2/7/24: 163 lb (73.9 kg).    Upper Extremity Exam:    Pertinent Exam Findings  Right:   No muscle atrophy, 5/5 strength of APB  Negative Durkan's, negative Tinel's, negative Phalen's  2 PD 5 mm in all digits    Left:   No muscle atrophy, 5/5 strength of APB  Negative Durkan's, negative Tinel's, negative Phalen's  2 PD 5 mm in all digits    Mild TTP A1 pulley of the left middle finger with active triggering noted    Palpatory:  No areas of pain or discomfort identified except for above exam findings  Motor Exam  Distal motor function intact in the median, ulnar, and radial nerve distributions unless specified above  Sensory Exam  Sensation intact to light touch to median, ulnar and radial nerve distribution unless specified above  Vascular:  Radial/Ulnar Pulses 2+ unless specified above  All fingertips are pink with capillary refill less than 2 seconds unless  specified above    Diagnostic Studies:     None obtained    Assessment/Plan     This 63 year old year old female presents with the following diagnoses and plan:    Bilateral right greater than left carpal tunnel syndrome.   Left middle trigger finger.     I had a lengthy discussion with the patient regarding the diagnosis of carpal tunnel syndrome and the available non-operative and operative treatment options including therapy, medication, nighttime wrist bracing, injection, and surgical release. After consideration of the above given that she had good relief of her symptoms with the injections and has failed non-operative treatment, I have recommended and the patient elected to proceed with surgical release. The risks and benefits as well as the recovery and rehabilitative timeline for endoscopic versus open carpal tunnel release were discussed at length including the short-term benefits of less pain and quicker return to work but increased risk for iatrogenic nerve injury with endoscopic release. The patient wishes to proceed with endoscopic release on the right side. We did discuss the risk for conversion to open release if there is concern for increased risk of iatrogenic nerve injury. We also discussed at length that it can take several weeks to months and sometimes even up to a year for symptom improvement, and that there is sometimes irreversible nerve damage and therefore persistent symptoms that cannot be undone with surgery. We will proceed in the near future.     With regards to her trigger finger, we discussed nighttime splinting, injection, and surgery. She would like another injection today which was provided (see separate Procedure Note). All questions were answered.    The patient understands the natural history of their disease and all their options.   Risks and benefits, as well as alternatives, of surgery were discussed in detail.   Risks including infection, bleeding, damage to nerves, arteries,  tendons, persistent pain, failure of surgery to relieve all pain/dysfunction/deformity, malunion, nonunion (if a bony injury), recurrence of problem, and need for further surgery were all discussed.  Risks for anesthesia complications, DVT and death were also discussed  All questions were answered and they agree to the plan.    Elsie Ortez MD  [facility] [place]  Orthopaedic Surgery  Hand and Upper Extremity Specialist

## 2024-03-05 NOTE — ANESTHESIA PREPROCEDURE EVALUATION
PRE-OP EVALUATION    Patient Name: Veronica Cameron    Admit Diagnosis: CARPAL TUNNEL SYNDROME RIGHT WRIST    Pre-op Diagnosis: CARPAL TUNNEL SYNDROME RIGHT WRIST    RIGHT ENDOSCOPIC VERSUS RIGHT OPEN CARPAL TUNNEL RELEASE    Anesthesia Procedure: RIGHT ENDOSCOPIC VERSUS RIGHT OPEN CARPAL TUNNEL RELEASE (Right)    Surgeon(s) and Role:     * Elsie Ortez MD - Primary    Pre-op vitals reviewed.  Temp: 98.1 °F (36.7 °C)  Pulse: 51  Resp: 16  BP: 127/79  SpO2: 98 %  Body mass index is 27.9 kg/m².    Current medications reviewed.  Hospital Medications:   [MAR Hold] acetaminophen (Tylenol Extra Strength) tab 1,000 mg  1,000 mg Oral Once    [MAR Hold] scopolamine (Transderm-Scop) 1 MG/3DAYS patch 1 patch  1 patch Transdermal Once    lactated ringers infusion   Intravenous Continuous    ceFAZolin (Ancef) 2 g in 20mL IV syringe premix  2 g Intravenous Once    vancomycin (Vancocin) 1,000 mg in sodium chloride 0.9% 250 mL IVPB-ADDV  15 mg/kg Intravenous Once    And    gentamicin (Garamycin) 260 mg in sodium chloride 0.9% 100 mL IVPB  5 mg/kg (Ideal) Intravenous Once       Outpatient Medications:     Medications Prior to Admission   Medication Sig Dispense Refill Last Dose    hydroCHLOROthiazide 25 MG Oral Tab Take 1 tablet (25 mg total) by mouth daily. 90 tablet 3 3/5/2024    escitalopram (LEXAPRO) 5 MG Oral Tab Take 17.5mg daily for one week then 15mg daily for one week then 12.5mg daily for one week then 10mg daily for one week then 7.5mg for one week then 5mg for one week and finally 2.5mg for one week then stop 30 tablet 0 3/5/2024    escitalopram (LEXAPRO) 10 MG Oral Tab Take 17.5mg daily for one week then 15mg daily for one week then 12.5mg daily for one week then 10mg daily for one week then 7.5mg for one week then 5mg for one week and finally 2.5mg for one week then stop 30 tablet 0 3/5/2024    amLODIPine 10 MG Oral Tab Take 1 tablet (10 mg total) by mouth daily. 90 tablet 1 3/4/2024    metoprolol succinate ER 50  MG Oral Tablet 24 Hr Take 1 tablet (50 mg total) by mouth daily. 90 tablet 0 3/5/2024    spironolactone 50 MG Oral Tab Take 1 tablet (50 mg total) by mouth daily. 90 tablet 1 3/5/2024    Turmeric 400 MG Oral Cap Take by mouth.   Past Week    EPINEPHrine 0.3 MG/0.3ML Injection Solution Auto-injector Inject 0.3 mL (1 each total) into the muscle as needed. 2 each 0     HYDROXYCHLOROQUINE 200 MG Oral Tab TAKE TWO TABLETS BY MOUTH DAILY (Patient taking differently: Take 1.5 tablets (300 mg total) by mouth daily. TAKE TWO TABLETS BY MOUTH DAILY) 180 tablet 2 3/5/2024    SULFASALAZINE 500 MG Oral Tab TAKE TWO TABLETS BY MOUTH DAILY 180 tablet 1 3/5/2024    Fish Oil-Cholecalciferol (FISH OIL + D3 OR) Take 1 tablet by mouth.   Past Week       Allergies: Bee venom and Pcn [penicillins]      Anesthesia Evaluation    Patient summary reviewed.    Anesthetic Complications  (+) history of anesthetic complications  History of: PONV       GI/Hepatic/Renal    Negative GI/hepatic/renal ROS.                             Cardiovascular    Negative cardiovascular ROS.  ECG reviewed.  Exercise tolerance: good     MET: >4      (+) hypertension                       (-) angina     (-) CARRERA         Endo/Other    Negative endo/other ROS.                       (+) arthritis  (+) rheumatoid arthritis     Pulmonary    Negative pulmonary ROS.           (-) shortness of breath  (-) recent URI          Neuro/Psych    Negative neuro/psych ROS.             (+) neuromuscular disease                     Past Surgical History:   Procedure Laterality Date    COLONOSCOPY      TARYN BIOPSY STEREO NODULE 1 SITE LEFT (CPT=19081)  08/2017    benign    TARYN BIOPSY STEREO NODULE 2 SITE BILAT (CPT=19081/00740)  08/2017    benign-fibrous mastopathy    NEEDLE BIOPSY LEFT Left 07/21/2023    stromal fibrosis-benign     Social History     Socioeconomic History    Marital status:    Tobacco Use    Smoking status: Former     Years: 2     Types: Cigarettes     Quit  date: 1980     Years since quittin.2    Smokeless tobacco: Never   Vaping Use    Vaping Use: Never used   Substance and Sexual Activity    Alcohol use: Yes     Alcohol/week: 7.0 standard drinks of alcohol     Types: 7 Glasses of wine per week     Comment: 1-2/day    Drug use: No     History   Drug Use No     Available pre-op labs reviewed.  Lab Results   Component Value Date    WBC 4.7 2024    RBC 4.41 2024    HGB 14.3 2024    HCT 40.9 2024    MCV 92.7 2024    MCH 32.4 2024    MCHC 35.0 2024    RDW 12.5 2024    .0 2024     Lab Results   Component Value Date     2024    K 5.3 (H) 2024     2024    CO2 28.0 2024    BUN 23 2024    CREATSERUM 0.95 2024     (H) 2024    CA 10.0 2024            Airway      Mallampati: II  Mouth opening: >3 FB  TM distance: 4 - 6 cm  Neck ROM: full Cardiovascular    Cardiovascular exam normal.         Dental  Comment: Denies loose, chipped, cracked teeth, Dentition grossly intact on exam             Pulmonary    Pulmonary exam normal.                 Other findings              ASA: 2   Plan: MAC  NPO status verified and patient meets guidelines.    Post-procedure pain management plan discussed with surgeon and patient.    Comment: Spoke with pt and discussed risks of MAC including, conversion to GA with ETT, nausea, vomiting, injury to lips, gums, tongue, teeth, eyes, awareness under anesthesia, cardiac, pulmonary, aspiration, and stroke events, delayed emergence, allergic reaction, and death. Pt understands and consents to receiving anesthesia    Plan/risks discussed with: patient                Present on Admission:  **None**

## 2024-03-05 NOTE — DISCHARGE INSTRUCTIONS
Elsie Ortez MD  Hand and Upper Extremity Surgeon  [facility]       Date of Surgery: 3/5/2024  MD Follow up: Two weeks  Dressings: Soft    POST-OPERATIVE INSTRUCTIONS    Follow Up:  Your follow up should have been made for you before the surgery  If you do not have a follow up day or have questions regarding it, call [phone] to schedule an appointment two weeks after surgery    You may be placed in one of the following dressings after surgery:  (x) Soft dressing: if placed in a soft dressing with no hard plaster underneath, you can generally remove this dressing in 7 days after surgery and keep your incisions clean and dry. If you are not sure, do not hesitate to call our office regarding directions    Showers:  Keep dressing and/or wound dry until you see me in the office    Activity:  You can be partial weight bearing to the operative extremity, lifting no heavier than 5 lbs, make a fist often and move fingers   It is important to elevate your operative extremity above the level of your heart to help with swelling, which can contribute to pain. Swelling, warmth, bruising, and redness can be normal part of the initial recovery process    Driving:  You should refrain from driving if you are on narcotic pain medications (Tramadol, Norco, Percocet, etc.). If you are off narcotic pain medications, you may start driving when you feel comfortable and safe doing so. Please use caution before starting to drive again    Pain Medication Plan:  The goal of pain medication after surgery is to keep your pain at a controlled and tolerable level. It is important to understand that pain medication may not take ALL of the pain away  Your pain medications should have been prescribed electronically to your pharmacy  The pain medications I have recommended for your procedure are Tramadol  Please call [phone] if you have issues with these medications    Over the counter pain medications  Start with over  the counter pain medications first  Naproxen (Brand name Aleve)   Comes in 220 mg tablets  Take this once in the morning and once at night initially, with food  If this does not control your pain, you can take 2 pills in the morning and 2 at night  Do not take this if you have medical conditions affecting your heart/kidneys  Acetaminophen (Brand name Tylenol) Extra Strength  Comes in 650 mg tablets  You can take Tylenol 3 times a day for a maximum of 3000 mg a day  Do not take this if you have medical conditions affecting your liver  Docusate Sodium (Brand name Colace)  This medication will help you if you become constipated from narcotic pain medications    Medications you may have been prescribed  If you were prescribed Oxycodone, Norco, Vicodin, or Percocet, these should be taken to assist with sleeping at night and severe pain, only if needed, for the first week. Narcotic pain medications can lead to addiction, respiratory sedation, and death. Try to take these as little as possible if you can.    While on narcotic pain medication, please refrain from:  Driving  Operating heavy machinery/power tools  Drinking alcohol  Staying by one's self  Making important decisions or signing legal documents    If you have any of the following signs or symptoms please proceed to your nearest Emergency Room:   Chest Pain  Shortness of Breath/ Difficulty Breathing  Excessive Bleeding  Anything else you deem an emergency    Please call the surgical team if you have the following:   Excessive swelling   Foul smelling odor from your wounds or dressings  Discharge from your surgical wounds  Persistent pain that does not get better with the prescription pain medication  Persistent nausea and/or vomiting  Fevers greater than 101.1 after the first 48 hrs post op  Dramatic changes to you post operative pain    You can resume all of your home medications after surgery with the following exceptions:   Immune Modulating Drugs: These are  drugs for rheumatoid arthritis, psoriasis and chemotherapy. If you are taking these drugs please check with the surgical team  Steroids: please ask the surgical team when to restart your normal steroid doses  Blood thinners: these are medications like Coumadin may be held post operatively in some cases, please check with your surgical team or your surgeon's nurse     Please do not hesitate to contact us with questions or concerns:   If you have issues during the day, please call the orthopedic clinic at [phone]  If you have urgent questions after hours please call [phone] and ask to page the PA on call

## 2024-03-05 NOTE — OPERATIVE REPORT
OPERATIVE REPORT    Patient Name: Veronica Cameron  Age:  63 year old  Sex: Female  MRN: [id number]  : 1960  Date of Surgery: 24  Primary Surgeon: Elsie Ortez MD  Assistant(s): None     Preoperative Diagnosis: Right carpal tunnel syndrome    Postoperative Diagnosis: Same as pre-operative diagnosis     Operation Performed: Right carpal tunnel release, CPT code 99384     Implants: None     Anesthesia: MAC    Complications: None    Estimated Blood Loss: Minimal    Tourniquet Time: 4 minutes    Specimens: None    Antibiotics: None    INDICATIONS FOR SURGERY:   The patient is a 63 year old year-old female who presented to my office with clinical history, exam, and diagnostics if indicated that were consistent with carpal tunnel syndrome and they had failed non-operative management. I recommended and the patient elected to proceed with open surgical release. The risks of surgery include, but are not limited to, bleeding, infection, injury to neurovascular structures, DVT, PE, other medical complications, wound complications, pain, stiffness, recurrence of the problem, and persistent numbness/tingling. The patient understood all of these and gave consent to proceed freely.    OPERATIVE PROCEDURE:  The patient was seen in the preoperative holding area, where the correct site was marked.  All questions regarding the procedure and postoperative rehabilitation were discussed.  The consent was signed. H&P and allergies were reviewed.     The patient was then brought into the operating room and placed supine on a stretcher with a hand table. The patient had SCD boots for DVT prophylaxis. The patient was administered sedation.  All bony prominences were padded. A nonsterile tourniquet was placed on the upper brachium. A time-out was performed by all members of the staff, which confirmed the right side was the correct operative side. I administered a local block using a combination of Marcaine and Lidocaine both  plain. The right upper extremity was then prepped and draped in standard sterile fashion.  The limb was exsanguinated with an esmarch and the tourniquet was then inflated to 250 mmHg.      A 15 blade was used to make the incision using relevant landmarks. It began 0.5 cm distal to the distal wrist crease in line with the radial border of the ring finger. The incision was approximately 2 cm in length. The incision was made through skin, subcutaneous tissue, and palmar fascia down to the transverse carpal ligament.  Under direct visualization, the transverse carpal ligament was identified and incised just ulnar to the hook of the hamate. I sharply incised the entirety of the transverse carpal ligament distally under direct visualization just past the fat surrounding the superficial palmar arch.  I then incised the proximal portion of the transverse carpal ligament to and well across the wrist crease to include the distal forearm fascia.  The ligament was sharply divided with care given to avoid injury to adjacent neurovascular structures.  I was able to confirm a complete release proximally and distally by direct visualization and by palpation; there were no constrictive elements affecting the nerve.     The wound was copiously irrigated. The tourniquet was let down for the time noted above. Hemostasis was achieved with bipolar cautery. Wound closure was achieved with 4-0 Nylon. All the fingers were warm with good capillary refill.  All sponge, sharp, and instrument counts were correct. A bulky sterile dressing  was applied. The patient was then awoken from sedation and transferred to the recovery room in stable condition.     Post-Operative Plan:   PWB soft dressing, encourage digit ROM  Multimodal pain control  Follow up in office in two weeks     Elsie Ortez MD  Orthopedic Surgery

## 2024-03-06 RX ORDER — METOPROLOL SUCCINATE 50 MG/1
50 TABLET, EXTENDED RELEASE ORAL DAILY
Qty: 90 TABLET | Refills: 0 | Status: SHIPPED | OUTPATIENT
Start: 2024-03-06

## 2024-03-06 NOTE — TELEPHONE ENCOUNTER
Name from pharmacy: Metoprolol Succinate Er 24hr 50 Mg Tab Nort         Will file in chart as: METOPROLOL SUCCINATE ER 50 MG Oral Tablet 24 Hr    Sig: TAKE ONE TABLET BY MOUTH DAILY    Disp: 90 tablet    Refills: 0    Start: 3/6/2024    Class: Normal    Non-formulary    Last ordered: 3 months ago (12/4/2023) by Mike Carrillo MD    Last refill: 12/4/2023    Rx #: [id number]    Hypertension Medications Protocol [id number]/06/2024 08:51 AM   Protocol Details CMP or BMP in past 12 months    Last BP reading less than 140/90    In person appointment or virtual visit in the past 12 mos or appointment in next 3 mos    EGFRCR or GFRNAA > 50      To be filled at: [id number] - [place], [place] - [address] [facility] [phone], [phone]       Last refill 12/4/23    LOV 1/30/24    Medication past protocol     Medication sent

## 2024-03-21 ENCOUNTER — TELEPHONE (OUTPATIENT)
Facility: LOCATION | Age: 64
End: 2024-03-21

## 2024-03-21 ENCOUNTER — OFFICE VISIT (OUTPATIENT)
Facility: LOCATION | Age: 64
End: 2024-03-21
Payer: MEDICARE

## 2024-03-21 VITALS — TEMPERATURE: 96 F | HEART RATE: 68 BPM

## 2024-03-21 DIAGNOSIS — L98.9 SCALP LESION: Primary | ICD-10-CM

## 2024-03-21 PROCEDURE — 88305 TISSUE EXAM BY PATHOLOGIST: CPT | Performed by: SURGERY

## 2024-03-21 PROCEDURE — 11421 EXC H-F-NK-SP B9+MARG 0.6-1: CPT | Performed by: SURGERY

## 2024-03-21 PROCEDURE — 88304 TISSUE EXAM BY PATHOLOGIST: CPT | Performed by: SURGERY

## 2024-03-21 NOTE — PROGRESS NOTES
Procedure: Midsagittal area of the scalp prepped draped sterile infiltrated 1% Xylocaine with epinephrine performed an incision 1.5 cm in length with excision of a 8 mm subcutaneous mass specimen sent for pathology irrigation carried out wound approximated with 3-0 nylon simple interrupted x 3

## 2024-03-21 NOTE — TELEPHONE ENCOUNTER
DEPARTMENT OF RADIATION ONCOLOGY ON TREATMENT VISIT           10/21/20        NAME:  Beth Harada    YOB: Marylen Pass.  Yanet Jurado MD MS HENRIR  Radiation Oncologist        [facility]): [phone] /// FAX: [phone]  [facility]): [phone] /// FAX: [phone]  [facility]): [phone] /// FAX: [phone]

## 2024-03-22 RX ORDER — SPIRONOLACTONE 50 MG/1
50 TABLET, FILM COATED ORAL DAILY
Qty: 90 TABLET | Refills: 0 | Status: SHIPPED | OUTPATIENT
Start: 2024-03-22

## 2024-03-22 NOTE — TELEPHONE ENCOUNTER
Hypertension Medications Protocol [id number]/21/2024 10:30 AM   Protocol Details CMP or BMP in past 12 months    Last BP reading less than 140/90    In person appointment or virtual visit in the past 12 mos or appointment in next 3 mos    EGFRCR or GFRNAA > 50        Last OV 1/30/24  Last lab 1/30/24 cmp  Last refilled 9/20/23  #90 1 refill

## 2024-03-29 ENCOUNTER — OFFICE VISIT (OUTPATIENT)
Facility: LOCATION | Age: 64
End: 2024-03-29
Payer: MEDICARE

## 2024-03-29 VITALS — HEART RATE: 75 BPM | TEMPERATURE: 97 F

## 2024-03-29 DIAGNOSIS — Z98.890 POST-OPERATIVE STATE: Primary | ICD-10-CM

## 2024-03-29 PROCEDURE — 99024 POSTOP FOLLOW-UP VISIT: CPT | Performed by: PHYSICIAN ASSISTANT

## 2024-03-29 NOTE — PROGRESS NOTES
Post Operative Visit Note       Active Problems  1. Post-operative state         Chief Complaint   Chief Complaint   Patient presents with    [facility]- 3/21 Removal of Scalp Lesion in office f/u- suture removal           History of Present Illness   63 year old female who presents today for postoperative visit following removal of scalp lesion on 3/21/2024 by Dr. East.    The patient states that her wound is overall healing very well.  She reports minimal tenderness.  She denies drainage.  She denies fever or chills.        Allergies  Veronica is allergic to bee venom and pcn [penicillins].    Past Medical / Surgical / Social / Family History    The past medical and past surgical history have been reviewed by me today.     Past Medical History:   Diagnosis Date    Anxiety state     Arthritis     Bee sting allergy     has epi pen    Chronic back pain     d/t lumbar ddd    Decorative tattoo     High blood pressure     Osteoarthrosis, unspecified whether generalized or localized, unspecified site     and likely rheumatoid arthritis    Osteoporosis     Pain in joints     PONV (postoperative nausea and vomiting) 09/2022    Vomiting after knee procedure    Unspecified essential hypertension     Visual impairment     Glasses/Contacts    Wears glasses      Past Surgical History:   Procedure Laterality Date    COLONOSCOPY      TARYN BIOPSY STEREO NODULE 1 SITE LEFT (CPT=19081)  08/2017    benign    TARYN BIOPSY STEREO NODULE 2 SITE BILAT (CPT=19081/51811)  08/2017    benign-fibrous mastopathy    NEEDLE BIOPSY LEFT Left 07/21/2023    stromal fibrosis-benign       The family history and social history have been reviewed by me today.    Family History   Problem Relation Age of Onset    Other (Other) Mother         going into assisted living, start of dementia,    Hypertension Son     Cancer Paternal Grandmother         female cancer she survived in her 80s    Ovarian Cancer Paternal Grandmother 86    Cancer Father       Social History     Socioeconomic History    Marital status:    Tobacco Use    Smoking status: Former     Years: 2     Types: Cigarettes     Quit date: 1980     Years since quittin.2    Smokeless tobacco: Never   Vaping Use    Vaping Use: Never used   Substance and Sexual Activity    Alcohol use: Yes     Alcohol/week: 7.0 standard drinks of alcohol     Types: 7 Glasses of wine per week     Comment: 1-2/day    Drug use: No        Current Outpatient Medications:     SPIRONOLACTONE 50 MG Oral Tab, Take 1 tablet (50 mg total) by mouth daily., Disp: 90 tablet, Rfl: 0    METOPROLOL SUCCINATE ER 50 MG Oral Tablet 24 Hr, TAKE ONE TABLET BY MOUTH DAILY, Disp: 90 tablet, Rfl: 0    traMADol 50 MG Oral Tab, Take 1-2 tablets ( mg total) by mouth every 6 (six) hours as needed for Pain., Disp: 5 tablet, Rfl: 0    hydroCHLOROthiazide 25 MG Oral Tab, Take 1 tablet (25 mg total) by mouth daily., Disp: 90 tablet, Rfl: 3    escitalopram (LEXAPRO) 5 MG Oral Tab, Take 17.5mg daily for one week then 15mg daily for one week then 12.5mg daily for one week then 10mg daily for one week then 7.5mg for one week then 5mg for one week and finally 2.5mg for one week then stop, Disp: 30 tablet, Rfl: 0    escitalopram (LEXAPRO) 10 MG Oral Tab, Take 17.5mg daily for one week then 15mg daily for one week then 12.5mg daily for one week then 10mg daily for one week then 7.5mg for one week then 5mg for one week and finally 2.5mg for one week then stop, Disp: 30 tablet, Rfl: 0    amLODIPine 10 MG Oral Tab, Take 1 tablet (10 mg total) by mouth daily., Disp: 90 tablet, Rfl: 1    Turmeric 400 MG Oral Cap, Take by mouth., Disp: , Rfl:     EPINEPHrine 0.3 MG/0.3ML Injection Solution Auto-injector, Inject 0.3 mL (1 each total) into the muscle as needed., Disp: 2 each, Rfl: 0    HYDROXYCHLOROQUINE 200 MG Oral Tab, TAKE TWO TABLETS BY MOUTH DAILY (Patient taking differently: Take 1.5 tablets (300 mg total) by mouth daily. TAKE TWO TABLETS  BY MOUTH DAILY), Disp: 180 tablet, Rfl: 2    SULFASALAZINE 500 MG Oral Tab, TAKE TWO TABLETS BY MOUTH DAILY, Disp: 180 tablet, Rfl: 1    Fish Oil-Cholecalciferol (FISH OIL + D3 OR), Take 1 tablet by mouth., Disp: , Rfl:       Review of Systems  A 10 point Review of Systems has been completed by me today and is negative except as above in the HPI.    Physical Findings   Pulse 75   Temp 97.1 °F (36.2 °C) (Temporal)   Gen/psych: alert and oriented, cooperative, no apparent distress  Cardiovascular: regular rate  Respiratory: respirations unlabored, no wheeze  Abdominal: soft, non-tender, non-distended, no guarding/rebound  Incisions:  Incision to the scalp has healed well.  Is intact.  There is no surrounding erythema or induration.  There is no drainage.  Incision is closed with interrupted nylon suture x 3.  Sutures were removed at the bedside today without difficulty.       Assessment/Plan  1. Post-operative state      I discussed with with the patient that she is overall doing very well following her procedure.  Surgical pathology was discussed with the patient.  Patient is recommended to avoid swimming/submerging her incision in water for 2 weeks from her procedure date.  All questions and concerns were answered.     No orders of the defined types were placed in this encounter.       Imaging & Referrals   None    Follow Up  Return if symptoms worsen or fail to improve.    Sallie Alcazar PA-C  [facility]  3/29/2024  10:15 AM

## 2024-06-04 DIAGNOSIS — I10 ESSENTIAL HYPERTENSION, BENIGN: Primary | ICD-10-CM

## 2024-06-04 RX ORDER — METOPROLOL SUCCINATE 50 MG/1
50 TABLET, EXTENDED RELEASE ORAL DAILY
Qty: 90 TABLET | Refills: 0 | Status: SHIPPED | OUTPATIENT
Start: 2024-06-04

## 2024-06-21 RX ORDER — SPIRONOLACTONE 50 MG/1
50 TABLET, FILM COATED ORAL DAILY
Qty: 90 TABLET | Refills: 0 | Status: SHIPPED | OUTPATIENT
Start: 2024-06-21

## 2024-07-08 RX ORDER — AMLODIPINE BESYLATE 10 MG/1
10 TABLET ORAL DAILY
Qty: 90 TABLET | Refills: 0 | Status: SHIPPED | OUTPATIENT
Start: 2024-07-08

## 2024-07-08 NOTE — TELEPHONE ENCOUNTER
Hypertension Medications Protocol [id number]/07/2024 08:29 AM   Protocol Details CMP or BMP in past 12 months    Last BP reading less than 140/90    In person appointment or virtual visit in the past 12 mos or appointment in next 3 mos    EGFRCR or GFRNAA > 50          LOV 1/30/24     Last Refill   amLODIPine 10 MG Oral Tab 90 tablet 1 1/2/2024       Labs 2/28/24     Last BP /70        No future appointments.

## 2024-08-14 ENCOUNTER — OFFICE VISIT (OUTPATIENT)
Dept: FAMILY MEDICINE CLINIC | Facility: CLINIC | Age: 64
End: 2024-08-14
Payer: MEDICARE

## 2024-08-14 VITALS
TEMPERATURE: 97 F | HEART RATE: 68 BPM | SYSTOLIC BLOOD PRESSURE: 130 MMHG | WEIGHT: 157 LBS | DIASTOLIC BLOOD PRESSURE: 78 MMHG | BODY MASS INDEX: 27 KG/M2 | OXYGEN SATURATION: 95 % | RESPIRATION RATE: 18 BRPM

## 2024-08-14 DIAGNOSIS — L65.8 TRAUMATIC ALOPECIA: Primary | ICD-10-CM

## 2024-08-14 PROCEDURE — 99213 OFFICE O/P EST LOW 20 MIN: CPT | Performed by: FAMILY MEDICINE

## 2024-08-14 RX ORDER — PREDNISONE 20 MG/1
20 TABLET ORAL DAILY
COMMUNITY
Start: 2024-08-07 | End: 2024-08-14

## 2024-08-14 NOTE — PROGRESS NOTES
Chief Complaint   Patient presents with    Cyst     Last fall, Pt had cyst that got infected, was removed in February, and Pt has a bald spot in that area.          HPI  Pt is here for evaluation of an area of alopecia that she developed after she had an infected sebaceous cyst removed about 6 months ago.     ROS  As per HPI and all other systems reviewed and are negative      Past Medical History:    Anxiety state    Arthritis    Bee sting allergy    has epi pen    Chronic back pain    d/t lumbar ddd    Decorative tattoo    High blood pressure    Osteoarthrosis, unspecified whether generalized or localized, unspecified site    and likely rheumatoid arthritis    Osteoporosis    Pain in joints    PONV (postoperative nausea and vomiting)    Vomiting after knee procedure    Unspecified essential hypertension    Visual impairment    Glasses/Contacts    Wears glasses       Past Surgical History:   Procedure Laterality Date    Colonoscopy      [place] biopsy stereo nodule 1 site left (cpt=19081)  2017    benign    [place] biopsy stereo nodule 2 site bilat (cpt=19081/77740)  2017    benign-fibrous mastopathy    Needle biopsy left Left 2023    stromal fibrosis-benign       Social History     Socioeconomic History    Marital status:    Tobacco Use    Smoking status: Former     Current packs/day: 0.00     Types: Cigarettes     Start date: 1978     Quit date: 1980     Years since quittin.6     Passive exposure: Past    Smokeless tobacco: Never   Vaping Use    Vaping status: Never Used   Substance and Sexual Activity    Alcohol use: Yes     Alcohol/week: 7.0 standard drinks of alcohol     Types: 7 Glasses of wine per week     Comment: 1-2/day    Drug use: No       Family History   Problem Relation Age of Onset    Other (Other) Mother         going into assisted living, start of dementia,    Hypertension Son     Cancer Paternal Grandmother         female cancer she survived in her 80s    Ovarian Cancer  Paternal Grandmother 86    Cancer Father         Current Outpatient Medications on File Prior to Visit   Medication Sig Dispense Refill    predniSONE 20 MG Oral Tab Take 1 tablet (20 mg total) by mouth daily.      AMLODIPINE 10 MG Oral Tab TAKE ONE TABLET BY MOUTH DAILY 90 tablet 0    SPIRONOLACTONE 50 MG Oral Tab TAKE ONE TABLET BY MOUTH ONE TIME DAILY 90 tablet 0    METOPROLOL SUCCINATE ER 50 MG Oral Tablet 24 Hr TAKE ONE TABLET BY MOUTH DAILY 90 tablet 0    hydroCHLOROthiazide 25 MG Oral Tab Take 1 tablet (25 mg total) by mouth daily. 90 tablet 3    Turmeric 400 MG Oral Cap Take by mouth.      EPINEPHrine 0.3 MG/0.3ML Injection Solution Auto-injector Inject 0.3 mL (1 each total) into the muscle as needed. 2 each 0    HYDROXYCHLOROQUINE 200 MG Oral Tab TAKE TWO TABLETS BY MOUTH DAILY (Patient taking differently: Take 1.5 tablets (300 mg total) by mouth daily. TAKE TWO TABLETS BY MOUTH DAILY) 180 tablet 2    SULFASALAZINE 500 MG Oral Tab TAKE TWO TABLETS BY MOUTH DAILY 180 tablet 1    Fish Oil-Cholecalciferol (FISH OIL + D3 OR) Take 1 tablet by mouth.       No current facility-administered medications on file prior to visit.         Objective  Vitals:    08/14/24 0725   BP: 130/78   Pulse: 68   Resp: 18   Temp: 97.1 °F (36.2 °C)   SpO2: 95%   Weight: 157 lb (71.2 kg)     Physical Exam  Constitutional:       Appearance: Normal appearance.   HEENT:      Head: Normocephalic and atraumatic.      Eyes: PERRLA no notable nystagmus     Ears: normal on observation  Skin:     General: Skin is warm and dry. Pt has a silver dollar size area of alopecia on her right parietofrontal scalp area  Neurological:      General: No focal deficit present.      Mental Status: She is alert and oriented to person, place, and time.   Psychiatric:         Mood and Affect: Mood normal.         Thought Content: Thought content normal.       Assessment and Plan  Veronica was seen today for cyst.    Diagnoses and all orders for this  visit:    Traumatic alopecia  -     Derm Referral - In Network           Follow up  No follow-ups on file.      Patient Instructions  There are no Patient Instructions on file for this visit.       Mike Carrillo MD

## 2024-09-03 DIAGNOSIS — I10 ESSENTIAL HYPERTENSION, BENIGN: ICD-10-CM

## 2024-09-04 RX ORDER — METOPROLOL SUCCINATE 50 MG/1
50 TABLET, EXTENDED RELEASE ORAL DAILY
Qty: 90 TABLET | Refills: 0 | Status: SHIPPED | OUTPATIENT
Start: 2024-09-04

## 2024-09-04 NOTE — TELEPHONE ENCOUNTER
Hypertension Medications Protocol Eysmxo0809/03/2024 08:36 PM   Protocol Details CMP or BMP in past 12 months    Last BP reading less than 140/90    In person appointment or virtual visit in the past 12 mos or appointment in next 3 mos    EGFRCR or GFRNAA > 50   Refilled per protocol  METOPROLOL SUCCINATE ER 50 MG Oral Tablet 24 Hr   Last refilled on 6/4/24 #90 with 0 rf.  LOV- 8/14/24  Last labs- 8/7/24    Sent to pharmacy

## 2024-09-19 RX ORDER — SPIRONOLACTONE 50 MG/1
50 TABLET, FILM COATED ORAL DAILY
Qty: 90 TABLET | Refills: 0 | Status: SHIPPED | OUTPATIENT
Start: 2024-09-19

## 2024-09-19 NOTE — TELEPHONE ENCOUNTER
Hypertension Medications Protocol Gzlkbf5209/19/2024 08:32 AM   Protocol Details CMP or BMP in past 12 months    Last BP reading less than 140/90    In person appointment or virtual visit in the past 12 mos or appointment in next 3 mos    EGFRCR or GFRNAA > 50      Refilled per protocol  SPIRONOLACTONE 50 MG Oral Tab   Last refilled on 6/21/24 #90 with 0 rf.  LOV- 8/14/24  Wellness-1/30/24  Last labs- 8/7/24    Sent to provider.

## 2024-10-03 ENCOUNTER — APPOINTMENT (OUTPATIENT)
Dept: DERMATOLOGY | Age: 64
End: 2024-10-03

## 2024-10-03 DIAGNOSIS — L57.0 AK (ACTINIC KERATOSIS): Primary | ICD-10-CM

## 2024-10-03 DIAGNOSIS — L63.9 ALOPECIA AREATA, UNSPECIFIED: ICD-10-CM

## 2024-10-11 ENCOUNTER — TELEPHONE (OUTPATIENT)
Dept: DERMATOLOGY | Age: 64
End: 2024-10-11

## 2024-10-14 RX ORDER — AMLODIPINE BESYLATE 10 MG/1
10 TABLET ORAL DAILY
Qty: 90 TABLET | Refills: 0 | Status: SHIPPED | OUTPATIENT
Start: 2024-10-14

## 2024-10-14 NOTE — TELEPHONE ENCOUNTER
Amlodipine Besylate 10 Mg Tab Unic     LOV 8/14/2024  Last Px: [id number]  Last labs 1/30/2024  Last refill on 7/8/2024, for #90, with 0 refills    No future appointments.    Hypertension Medications Protocol Qwqagb70/14/2024 06:54 AM   Protocol Details CMP or BMP in past 12 months    Last BP reading less than 140/90    In person appointment or virtual visit in the past 12 mos or appointment in next 3 mos    EGFRCR or GFRNAA > 50

## 2024-10-23 ENCOUNTER — APPOINTMENT (OUTPATIENT)
Dept: DERMATOLOGY | Age: 64
End: 2024-10-23

## 2024-10-23 DIAGNOSIS — L57.0 AK (ACTINIC KERATOSIS): Primary | ICD-10-CM

## 2024-12-04 DIAGNOSIS — I10 ESSENTIAL HYPERTENSION, BENIGN: ICD-10-CM

## 2024-12-04 RX ORDER — METOPROLOL SUCCINATE 50 MG/1
50 TABLET, EXTENDED RELEASE ORAL DAILY
Qty: 90 TABLET | Refills: 1 | Status: SHIPPED | OUTPATIENT
Start: 2024-12-04

## 2024-12-04 NOTE — TELEPHONE ENCOUNTER
Sent per passed protocol.      Hypertension Medications Protocol Kuebaf4912/04/2024 08:26 AM   Protocol Details CMP or BMP in past 12 months    Last BP reading less than 140/90    In person appointment or virtual visit in the past 12 mos or appointment in next 3 mos    EGFRCR or GFRNAA > 50         [facility], [place]   Cardiac Follow Up    Date: 10/10/22  Patient Name: Kari Boateng  YOB: 1962    Primary Care Physician: Madison Schmitt MD    CHIEF COMPLAINT:   Chief Complaint   Patient presents with    Follow-up    Device Check    Atrial Fibrillation    Other     PVC, RBBB     HPI:  Kari Boateng is a 56 y.o. male who presents for follow up evaluation of hypertrophic cardiomyopathy, PVCs and NSVT. He was previously evaluated for an ICD at [facility] [place], [place]. He has worn cardiac monitors in the past that have shown paroxysmal atrial fibrillation and PVCs. His monitor from 4/2016 showed symptoms associated with PAF. His Lexiscan from 4/2016 showed an adequate response to Sandra Mackey. His monitor from 6/2017 showed PAF and frequent monomorphic PVCs. His echocardiogram from 10/2017 showed an EF of 55-60% with severe asymmetric left ventricular hypertrophy. His cardiac MRI showed normal LV chamber with an EF of 60%, Asymmetric basal and mid septal hypertrophy with the maximum end-diastolic myocardial thickness measuring up to 16 mm. Maximum LVOT velocity is 1.4 m/s. There is no LVOT gradient at rest.  There is some late gadolinium enhancement in the basal anteroseptal area. This was not quantified. In October 2019 his echo had similar findings along with a severely dilated left atrium. He then was evaluated by interventional cardiology who recommended ischemic testing. The patient refused any further testing at that time. In 4/2020, patient reported that his brother also has a mild case of septal wall thickening, but no history of unexplained syncope. He denied any unexplained syncope or dizziness. He generally is very active playing baseball. His activity levels have decreased recently due to his orthopedic issues and cough from his allergies. Prior to his knee pain, he was able to tolerate activity well without symptoms.  He also complained of a cough that he attributes to his season allergies as this occurs every year around the same time. He has been taking Verapamil for many years. He was told to take this to reduce the workload of his heart. When he does not take this, he feels spasms in his neck. He states that he has been taking Amiodarone for a few years and feels that it is now helping to control his symptoms related to his afib. His palpitations are reduced with it. He has noticed a sudden increase in his palpitations a few weeks prior, though attributes this to stress and allergies. On 7/1/2020, patient denied any dizziness or syncopal episodes  He stated that he does have the occasional flutter that occurs 2-3 times aweek. It lasts about half an hour. He had been noticing them mostly in the evening. He stated that he does not notice them when he does exercise. Event monitor from 4/20 demonstrates rare PVCs and no NSVT. He has not been very active lately due to knee pain. He is scheduled to get a scope in the near future with [facility] at [facility].  Patient's Amiodarone was decreased to 100mg QD at the conclusion of 7/1/2020 office visit. Patient wore a 7 day cardiac event monitor from 4/15/2020 to 4/22/2020 that demonstrated predominantly SB with an average Hr of 57 () BPM, <1% PACs and PVCs. He underwent a meniscus repair on his left knee via scope on 7/17/2020. Patient reported on 10/21/2020 that he increased in amiodarone back to 200mg QD during September 2020 due to increased palpitations. An ILR was discussed and agreed upon due to patient's ongoing palpitations and wanting to discontinue Eliquis. ILR implanted 12/11/2021. On 4/19/2021, patient's device interrogation demonstrates normal function, 1 episode of AF with conversion pauses 12/15/2020. He reported he is doing ok. He reports he has been experiencing fatigue, which is unchanged from prior visits. He reports he experiences palpitations that lasts several seconds and resolve.  He reports they are not bothersome. He reports he has had pain in the back of his left quad that has been occurring for about 1-2 weeks. He reports he was playing baseball the day prior and is unsure if this is related. Patient was seen in 5/2022 and had complaints of chest pain. Stress test was normal.    On 7/27/2022, Device interrogation demonstrated AVB in AF. He reports that he thinks he can feel his AF. He felt the pause that happened on 6/19. Today, 10/10/2022, Device interrogation demonstrated 100% AF burden. He feels fatigued and palpitations. He reports that he has not been feeling well since stopping amiodarone. He is SOB with activity. He has increased muscle pains. He has left side pain that lasts hours, happening every day. Does not radiate. Happens at rest. Pain is constant He is taking his medications as prescribed. Denies recent issues with bleeding or bruising. Patient denies current edema, chest pain, dizziness or syncope. Past Medical History:   has a past medical history of A-fib ([address] ADHD (attention deficit hyperactivity disorder), Carpal tunnel syndrome, Chronic low back pain, Chronic neck pain, Chronic sinusitis, Dental crown present, Epigastric hernia, Esophageal spasm, GERD (gastroesophageal reflux disease), Hyperlipidemia, Hypertrophic cardiomyopathy ([address] IHSS (idiopathic hypertrophic subaortic stenosis) ([address] Kidney stones, HUNTER on CPAP, Primary localized osteoarthrosis, shoulder region, Prostate cancer ([address] PVC's (premature ventricular contractions), RBBB (right bundle branch block), RLS (restless legs syndrome), Seasonal allergies, and Tachycardia. Surgical History:   has a past surgical history that includes Knee arthroscopy (Right, 1982); Umbilical hernia repair; Colonoscopy (1/17/11); TURP (2012); Inguinal hernia repair (Bilateral, 2009, 2012,); Varicocelectomy (2000); Shoulder arthroscopy (Right, 12/31/2013); Endoscopy, colon, diagnostic; Upper gastrointestinal endoscopy (9/3);  Elbow surgery (2015); Carpal tunnel release (Left); Knee arthroscopy (Left, 12/21/2017); Colonoscopy (N/A, 2/2/2020); Knee arthroscopy (Left, 7/17/2020); Insertable Cardiac Monitor (Left, 12/11/2020); and Insertable Cardiac Monitor (12/11/2020). Social History:   reports that he has never smoked. He has never used smokeless tobacco. He reports current alcohol use of about 1.0 - 2.0 standard drink per week. He reports that he does not use drugs. Family History:  family history includes Cancer in his mother; Heart Disease in his maternal grandfather and maternal grandmother; High Blood Pressure in his father; High Cholesterol in his brother and mother; Prostate Cancer in his paternal uncle.      Home Medications:  Outpatient Encounter Medications as of 10/10/2022   Medication Sig Dispense Refill    omeprazole (PRILOSEC) 40 MG delayed release capsule Take 1 capsule by mouth daily 90 capsule 1    Cyanocobalamin (B-12 PO) Take by mouth      b complex vitamins capsule Take 1 capsule by mouth daily      pravastatin (PRAVACHOL) 20 MG tablet Take 1 tablet by mouth every evening 90 tablet 1    clonazePAM (KLONOPIN) 1 MG tablet TAKE 1 TABLET BY MOUTH EVERY NIGHT AS NEEDED 30 tablet 2    CYCLOBENZAPRINE HCL PO Take by mouth Pt takes as needed      apixaban (ELIQUIS) 5 MG TABS tablet TAKE 1 TABLET BY MOUTH TWICE DAILY 180 tablet 3    verapamil (VERELAN) 240 MG extended release capsule TAKE 1 CAPSULE BY MOUTH EVERY NIGHT 90 capsule 3    tamsulosin (FLOMAX) 0.4 MG capsule Take 0.4 mg by mouth daily      Zinc Sulfate (ZINC 15 PO) daily      Cholecalciferol 25 MCG (1000 UT) CHEW Take 1,000 Units by mouth daily      butalbital-APAP-caffeine -40 MG CAPS per capsule TK ONE C PO  Q 12 H PRF SEVERE HEADACHE       Facility-Administered Encounter Medications as of 10/10/2022   Medication Dose Route Frequency Provider Last Rate Last Admin    sodium hyaluronate (viscosup) injection 20 mg  20 mg Intra-artICUlar Once Read Kiss, [place] Allergies:  Niacin and related     Review of Systems   Constitutional: Negative. HENT: Negative. Eyes: Negative. Respiratory: Negative. Cardiovascular: physiologically split  Gastrointestinal: Negative. Genitourinary: Negative. Musculoskeletal: Negative. Skin: Negative. Neurological: Negative. Hematological: Negative. Psychiatric/Behavioral: Negative. /68   Pulse 65   Ht 5' 11\" (1.803 m)   Wt 200 lb (90.7 kg)   SpO2 98%   BMI 27.89 kg/m²     Data:   ECG 10/10/22: Personally reviewed. ECHO 10/2/2019:      Summary   Normal systolic function with an estimated ejection fraction of 55-60%. Moderate concentric left ventricular hypertrophy ( septal wall is more   increased in size (1.6 cm) ). No regional wall motion abnormalities are seen. Normal left ventricular diastolic filling pressure. The left atrium is severely dilated. The right atrium is mildly dilated. Mild aortic regurgitation. Mild mitral and pulmonic regurgitation. Objective:  Physical Exam   Constitutional: He is oriented to person, place, and time. He appears well-developed and well-nourished. HENT:   Head: Normocephalic and atraumatic. Eyes: Pupils are equal, round, and reactive to light. Neck: Normal range of motion. Cardiovascular: Normal rate, regular rhythm and physiologically split 2nd heart sound, 2/6 VASHTI  Pulmonary/Chest: Effort normal and breath sounds normal.   Abdominal: Soft. No tenderness. Musculoskeletal: Normal range of motion. He exhibits no edema. Neurological: He is alert and oriented to person, place, and time. Skin: Skin is warm and dry. Psychiatric: He has a normal mood and affect. Assessment:  1. PAF- Eliquis for stroke prevention. Amiodarone stopped in 5/2022. Frequency and duration decreased. Remains symptomatic with the AF. We discussed further treatment options including pharmacologic therapy and catheter ablation.   Plan to start dofetilide. 2. HCM without LVOT gradient- he does not have any major risk factors for SCA in HCM. He does however have > 20% scar burden by LGE in the basal septum. In some studies, this finding has a stronger correlation with risk than the major risk factors. His age (> 61 years) is associated with a strong reduction in risk. We discussed these issues and have decided to monitor for symptoms and defer ICD implantation at this time. 3. PVCs-  2 week monitor worn 4/2020 demonstrated <1% PVC burden. No PVCs noted per ECG 4/19/2021.  4. RBBB- Per ECG 4/19/2021.   5. AV block only in AF. 6. Myalgias- trial off of statin    Plan:  Discussed dofetilide or sotalol. Require 3-day hospital stay. Discussed catheter ablation for AF. Plan to start dofetilide. Stop pravastatin for 2 weeks to see if this improves muscle pain. Keep follow up with [facility] as scheduled. QUALITY MEASURES  1. Tobacco Cessation Counseling: NA  2. Retake of BP if >140/90:   NA  3. Documentation to PCP/referring for new patient:  Sent to PCP at close of office visit  4. CAD patient on anti-platelet: NA  5. CAD patient on STATIN therapy:  NA  6. Patient with aFib on anticoagulation:  Yes      I, Carter Power RN, am scribing for and in the presence of Dr. Marga Mcguire. 10/10/22 11:00 AM   Carter Power RN    I, Dr. Marga Mcguire, personally performed the services described in this documentation as scribed by Carter Power RN in my presence, and it is both accurate and complete.      Marga Mcguire M.D.

## 2024-12-19 RX ORDER — SPIRONOLACTONE 50 MG/1
50 TABLET, FILM COATED ORAL DAILY
Qty: 90 TABLET | Refills: 0 | Status: SHIPPED | OUTPATIENT
Start: 2024-12-19

## 2024-12-19 NOTE — TELEPHONE ENCOUNTER
Spironolactone 50 Mg Tab Nort     Hypertension Medications Protocol [id number]/18/2024 08:32 PM   Protocol Details CMP or BMP in past 12 months    Last BP reading less than 140/90    In person appointment or virtual visit in the past 12 mos or appointment in next 3 mos    EGFRCR or GFRNAA > 50      LOV 8/14/2024  Last Px:1/30/2024  Last labs Comp, 1/30/2024, abnormal   Last refill on 9/19/2024, for #90, with 0 refills    No future appointments.

## 2024-12-20 ENCOUNTER — APPOINTMENT (OUTPATIENT)
Dept: DERMATOLOGY | Age: 64
End: 2024-12-20

## 2024-12-20 DIAGNOSIS — L63.9 ALOPECIA AREATA, UNSPECIFIED: Primary | ICD-10-CM

## 2025-01-10 ENCOUNTER — OFFICE VISIT (OUTPATIENT)
Dept: FAMILY MEDICINE CLINIC | Facility: CLINIC | Age: 65
End: 2025-01-10
Payer: MEDICARE

## 2025-01-10 VITALS
HEIGHT: 63 IN | SYSTOLIC BLOOD PRESSURE: 140 MMHG | HEART RATE: 81 BPM | WEIGHT: 154 LBS | TEMPERATURE: 97 F | OXYGEN SATURATION: 99 % | DIASTOLIC BLOOD PRESSURE: 90 MMHG | BODY MASS INDEX: 27.29 KG/M2 | RESPIRATION RATE: 18 BRPM

## 2025-01-10 DIAGNOSIS — J32.9 SINOBRONCHITIS: Primary | ICD-10-CM

## 2025-01-10 DIAGNOSIS — J40 SINOBRONCHITIS: Primary | ICD-10-CM

## 2025-01-10 DIAGNOSIS — R03.0 ELEVATED BLOOD PRESSURE READING: ICD-10-CM

## 2025-01-10 PROCEDURE — 99213 OFFICE O/P EST LOW 20 MIN: CPT | Performed by: PHYSICIAN ASSISTANT

## 2025-01-10 RX ORDER — ALBUTEROL SULFATE 90 UG/1
2 INHALANT RESPIRATORY (INHALATION) EVERY 4 HOURS PRN
Qty: 1 EACH | Refills: 0 | Status: SHIPPED | OUTPATIENT
Start: 2025-01-10

## 2025-01-10 RX ORDER — BENZONATATE 200 MG/1
200 CAPSULE ORAL 3 TIMES DAILY PRN
Qty: 30 CAPSULE | Refills: 0 | Status: SHIPPED | OUTPATIENT
Start: 2025-01-10

## 2025-01-10 RX ORDER — DOXYCYCLINE 100 MG/1
100 CAPSULE ORAL 2 TIMES DAILY
Qty: 14 CAPSULE | Refills: 0 | Status: SHIPPED | OUTPATIENT
Start: 2025-01-10 | End: 2025-01-17

## 2025-01-10 NOTE — PROGRESS NOTES
CHIEF COMPLAINT:     Chief Complaint   Patient presents with    Sinus Problem     Started 2 weeks ago   No fevers        HPI:   Veronica Cameron is a 64 year old female who presents for upper respiratory symptoms for  2 +weeks. Patient reports congestion, dry cough, cough with ? (Swallowing) colored sputum, cough is keeping pt up at night, sinus pain, OTC cold meds have not been helping, prior history of sinusitis, denies fever. Symptoms have been persistent to gradually worsening since onset since onset.  Treating symptoms with OTC dayquil/nyquil, mucinex, zicam, etc.   (+) paroxysmal coughing spells.   Denies fever, no chills/sweats, no SOB/CARRERA, no n/v/d, no post tussive emesis.   Denies h/o tobacco use or vaping. Denies h/o asthma.       H/o RA, on plaquenil.       Current Outpatient Medications   Medication Sig Dispense Refill    doxycycline 100 MG Oral Cap Take 1 capsule (100 mg total) by mouth 2 (two) times daily for 7 days. 14 capsule 0    albuterol 108 (90 Base) MCG/ACT Inhalation Aero Soln Inhale 2 puffs into the lungs every 4 (four) hours as needed. 1 each 0    benzonatate 200 MG Oral Cap Take 1 capsule (200 mg total) by mouth 3 (three) times daily as needed for cough. 30 capsule 0    SPIRONOLACTONE 50 MG Oral Tab TAKE ONE TABLET BY MOUTH DAILY 90 tablet 0    metoprolol succinate ER 50 MG Oral Tablet 24 Hr TAKE ONE TABLET BY MOUTH ONE TIME DAILY 90 tablet 1    AMLODIPINE 10 MG Oral Tab TAKE ONE TABLET BY MOUTH DAILY 90 tablet 0    hydroCHLOROthiazide 25 MG Oral Tab Take 1 tablet (25 mg total) by mouth daily. 90 tablet 3    Turmeric 400 MG Oral Cap Take by mouth.      EPINEPHrine 0.3 MG/0.3ML Injection Solution Auto-injector Inject 0.3 mL (1 each total) into the muscle as needed. 2 each 0    SULFASALAZINE 500 MG Oral Tab TAKE TWO TABLETS BY MOUTH DAILY 180 tablet 1    Fish Oil-Cholecalciferol (FISH OIL + D3 OR) Take 1 tablet by mouth.      HYDROXYCHLOROQUINE 200 MG Oral Tab TAKE TWO TABLETS BY MOUTH DAILY  (Patient taking differently: Take 1.5 tablets (300 mg total) by mouth daily. TAKE TWO TABLETS BY MOUTH DAILY) 180 tablet 2      Past Medical History:    Anxiety state    Arthritis    Bee sting allergy    has epi pen    Chronic back pain    d/t lumbar ddd    Decorative tattoo    High blood pressure    Osteoarthrosis, unspecified whether generalized or localized, unspecified site    and likely rheumatoid arthritis    Osteoporosis    Pain in joints    PONV (postoperative nausea and vomiting)    Vomiting after knee procedure    Unspecified essential hypertension    Visual impairment    Glasses/Contacts    Wears glasses      Past Surgical History:   Procedure Laterality Date    Colonoscopy      [place] biopsy stereo nodule 1 site left (cpt=19081)  2017    benign    [place] biopsy stereo nodule 2 site bilat (cpt=19081/05390)  2017    benign-fibrous mastopathy    Needle biopsy left Left 2023    stromal fibrosis-benign         Social History     Socioeconomic History    Marital status:    Tobacco Use    Smoking status: Former     Current packs/day: 0.00     Types: Cigarettes     Start date: 1978     Quit date: 1980     Years since quittin.0     Passive exposure: Past    Smokeless tobacco: Never   Vaping Use    Vaping status: Never Used   Substance and Sexual Activity    Alcohol use: Yes     Alcohol/week: 7.0 standard drinks of alcohol     Types: 7 Glasses of wine per week     Comment: 1-2/day    Drug use: No     Social Drivers of Health      [facility]    Housing Stability         REVIEW OF SYSTEMS:   GENERAL: mildly decreased appetite  SKIN: no rashes or abnormal skin lesions  HEENT: See HPI  LUNGS: See HPI  CARDIOVASCULAR: denies chest pain or palpitations   GI: denies N/V/C or abdominal pain      EXAM:   /90   Pulse 81   Temp 97.4 °F (36.3 °C)   Resp 18   Ht 5' 3\" (1.6 m)   Wt 154 lb (69.9 kg)   SpO2 99%   BMI 27.28  kg/m²   GENERAL: well developed, well nourished,in no apparent distress  SKIN: no rashes,no suspicious lesions  HEAD: atraumatic, normocephalic.    EYES: conjunctiva clear, EOM intact  EARS: TM's clear bilaterally  NOSE: Nostrils patent, clear/yellow nasal discharge, nasal mucosa edematous/erythematous   THROAT: Oral mucosa pink, moist. Posterior pharynx is mildly injected, no hypertrophy or exudates, uvula midline.   NECK: Supple, non-tender  LUNGS: Bibasilar rhonchi, clears with cough.  Remainder lung fields CTA, normal effort.  Frequent cough.  No retractions or accessory muscle use.   CARDIO: RRR without murmur  EXTREMITIES: no cyanosis, clubbing or edema  LYMPH:  shotty ant cervical LAD.       ASSESSMENT AND PLAN:   Veronica Cameron is a 64 year old female who presents with upper respiratory symptoms that are consistent with    ASSESSMENT:   Encounter Diagnoses   Name Primary?    Sinobronchitis Yes    Elevated blood pressure reading        PLAN: Meds as below.  Comfort care as described in Patient Instructions    Meds & Refills for this Visit:  Requested Prescriptions     Signed Prescriptions Disp Refills    doxycycline 100 MG Oral Cap 14 capsule 0     Sig: Take 1 capsule (100 mg total) by mouth 2 (two) times daily for 7 days.    albuterol 108 (90 Base) MCG/ACT Inhalation Aero Soln 1 each 0     Sig: Inhale 2 puffs into the lungs every 4 (four) hours as needed.    benzonatate 200 MG Oral Cap 30 capsule 0     Sig: Take 1 capsule (200 mg total) by mouth 3 (three) times daily as needed for cough.     Risks, benefits, and side effects of medication explained and discussed.    The patient indicates understanding of these issues and agrees to the plan.  The patient is asked to f/u with PCP if sx's persist or worsen.  There are no Patient Instructions on file for this visit.

## 2025-01-10 NOTE — PATIENT INSTRUCTIONS
Doxycycline 100 mg twice daily for 7 days.    Benzonatate 200 mg up to three times daily as needed for cough.   Albuterol inhaler 2 puffs inhaled every 4 to 6 hours as needed for coughing spasm, shortness of breath, wheezing.   Encourage fluids, humidifier/vaporizor at bedside, elevate head of bed (sleep with extra pillow), vapor rub to chest, steam therapy if no fever, warm compresses for sinus pressure if no fever, salt water gargles for sore throat, lozenges for sore throat, may try over the counter saline nasal spray or irrigation kit (use distilled water with irrigation kit) for sinus pressure/congestion, get plenty of rest.        Follow up with your primary care provider if your symptoms fail to improve and resolve as anticipated    Go to the Immediate Care or Emergency Department in event of new or worsening symptoms at any time

## 2025-01-11 RX ORDER — AMLODIPINE BESYLATE 10 MG/1
10 TABLET ORAL DAILY
Qty: 90 TABLET | Refills: 0 | Status: SHIPPED | OUTPATIENT
Start: 2025-01-11

## 2025-01-11 NOTE — TELEPHONE ENCOUNTER
Hypertension Medications Protocol Ricssf61/10/2025 07:02 PM   Protocol Details Last BP reading less than 140/90    CMP or BMP in past 12 months    In person appointment or virtual visit in the past 12 mos or appointment in next 3 mos    EGFRCR or GFRNAA > 50    Medication is active on med list          Last refill:   AMLODIPINE 10 MG Oral Tab 90 tablet 0 10/14/2024     Last Visit: 8/14/24    Labs:8/7/24    Last BP       Vitals:     08/14/24 0725   BP: 130/78     Next Visit:   Future Appointments   Date Time Provider Department Center   1/15/2025  9:00 AM Mike Carrillo MD [facility]         Forward to Dr. Tipton please advise on refills. Thanks.

## 2025-01-15 ENCOUNTER — OFFICE VISIT (OUTPATIENT)
Dept: FAMILY MEDICINE CLINIC | Facility: CLINIC | Age: 65
End: 2025-01-15
Payer: MEDICARE

## 2025-01-15 VITALS
RESPIRATION RATE: 18 BRPM | HEIGHT: 63.39 IN | BODY MASS INDEX: 26.57 KG/M2 | WEIGHT: 151.81 LBS | DIASTOLIC BLOOD PRESSURE: 82 MMHG | SYSTOLIC BLOOD PRESSURE: 130 MMHG | OXYGEN SATURATION: 95 % | TEMPERATURE: 98 F | HEART RATE: 72 BPM

## 2025-01-15 DIAGNOSIS — R79.89 ELEVATED SERUM CREATININE: ICD-10-CM

## 2025-01-15 DIAGNOSIS — Z12.31 ENCOUNTER FOR SCREENING MAMMOGRAM FOR HIGH-RISK PATIENT: ICD-10-CM

## 2025-01-15 DIAGNOSIS — Z00.00 ENCOUNTER FOR SUBSEQUENT ANNUAL WELLNESS VISIT (AWV) IN MEDICARE PATIENT: Primary | ICD-10-CM

## 2025-01-15 DIAGNOSIS — I10 ESSENTIAL HYPERTENSION, BENIGN: ICD-10-CM

## 2025-01-15 DIAGNOSIS — Z13.6 SCREENING, ISCHEMIC HEART DISEASE: ICD-10-CM

## 2025-01-15 DIAGNOSIS — E78.2 MIXED HYPERLIPIDEMIA: ICD-10-CM

## 2025-01-15 DIAGNOSIS — M06.00 SERONEGATIVE RHEUMATOID ARTHRITIS (HCC): ICD-10-CM

## 2025-01-15 LAB
ALBUMIN SERPL-MCNC: 4.8 G/DL (ref 3.2–4.8)
ALBUMIN/GLOB SERPL: 1.6 {RATIO} (ref 1–2)
ALP LIVER SERPL-CCNC: 68 U/L
ALT SERPL-CCNC: 33 U/L
ANION GAP SERPL CALC-SCNC: 10 MMOL/L (ref 0–18)
AST SERPL-CCNC: 38 U/L (ref ?–34)
BASOPHILS # BLD AUTO: 0.1 X10(3) UL (ref 0–0.2)
BASOPHILS NFR BLD AUTO: 1.2 %
BILIRUB SERPL-MCNC: 0.7 MG/DL (ref 0.2–1.1)
BUN BLD-MCNC: 19 MG/DL (ref 9–23)
CALCIUM BLD-MCNC: 10.8 MG/DL (ref 8.7–10.6)
CHLORIDE SERPL-SCNC: 101 MMOL/L (ref 98–112)
CHOLEST SERPL-MCNC: 239 MG/DL (ref ?–200)
CO2 SERPL-SCNC: 26 MMOL/L (ref 21–32)
CREAT BLD-MCNC: 0.72 MG/DL
CREAT UR-SCNC: 94.1 MG/DL
EGFRCR SERPLBLD CKD-EPI 2021: 92 ML/MIN/1.73M2 (ref 60–?)
EOSINOPHIL # BLD AUTO: 0.07 X10(3) UL (ref 0–0.7)
EOSINOPHIL NFR BLD AUTO: 0.9 %
ERYTHROCYTE [DISTWIDTH] IN BLOOD BY AUTOMATED COUNT: 13 %
FASTING PATIENT LIPID ANSWER: YES
FASTING STATUS PATIENT QL REPORTED: YES
GLOBULIN PLAS-MCNC: 3 G/DL (ref 2–3.5)
GLUCOSE BLD-MCNC: 108 MG/DL (ref 70–99)
HCT VFR BLD AUTO: 45.6 %
HDLC SERPL-MCNC: 74 MG/DL (ref 40–59)
HGB BLD-MCNC: 15.6 G/DL
IMM GRANULOCYTES # BLD AUTO: 0.03 X10(3) UL (ref 0–1)
IMM GRANULOCYTES NFR BLD: 0.4 %
LDLC SERPL CALC-MCNC: 150 MG/DL (ref ?–100)
LYMPHOCYTES # BLD AUTO: 2.41 X10(3) UL (ref 1–4)
LYMPHOCYTES NFR BLD AUTO: 30 %
MCH RBC QN AUTO: 32.4 PG (ref 26–34)
MCHC RBC AUTO-ENTMCNC: 34.2 G/DL (ref 31–37)
MCV RBC AUTO: 94.6 FL
MICROALBUMIN UR-MCNC: <0.3 MG/DL
MONOCYTES # BLD AUTO: 0.56 X10(3) UL (ref 0.1–1)
MONOCYTES NFR BLD AUTO: 7 %
NEUTROPHILS # BLD AUTO: 4.87 X10 (3) UL (ref 1.5–7.7)
NEUTROPHILS # BLD AUTO: 4.87 X10(3) UL (ref 1.5–7.7)
NEUTROPHILS NFR BLD AUTO: 60.5 %
NONHDLC SERPL-MCNC: 165 MG/DL (ref ?–130)
OSMOLALITY SERPL CALC.SUM OF ELEC: 287 MOSM/KG (ref 275–295)
PLATELET # BLD AUTO: 244 10(3)UL (ref 150–450)
POTASSIUM SERPL-SCNC: 3.8 MMOL/L (ref 3.5–5.1)
PROT SERPL-MCNC: 7.8 G/DL (ref 5.7–8.2)
RBC # BLD AUTO: 4.82 X10(6)UL
SODIUM SERPL-SCNC: 137 MMOL/L (ref 136–145)
TRIGL SERPL-MCNC: 88 MG/DL (ref 30–149)
VLDLC SERPL CALC-MCNC: 17 MG/DL (ref 0–30)
WBC # BLD AUTO: 8 X10(3) UL (ref 4–11)

## 2025-01-15 PROCEDURE — 80053 COMPREHEN METABOLIC PANEL: CPT | Performed by: FAMILY MEDICINE

## 2025-01-15 PROCEDURE — 82043 UR ALBUMIN QUANTITATIVE: CPT | Performed by: FAMILY MEDICINE

## 2025-01-15 PROCEDURE — 80061 LIPID PANEL: CPT | Performed by: FAMILY MEDICINE

## 2025-01-15 PROCEDURE — G0439 PPPS, SUBSEQ VISIT: HCPCS | Performed by: FAMILY MEDICINE

## 2025-01-15 PROCEDURE — 85025 COMPLETE CBC W/AUTO DIFF WBC: CPT | Performed by: FAMILY MEDICINE

## 2025-01-15 PROCEDURE — 82570 ASSAY OF URINE CREATININE: CPT | Performed by: FAMILY MEDICINE

## 2025-01-15 NOTE — PROGRESS NOTES
Subjective:   Veronica Cameron is a 64 year old female who presents for a Medicare Subsequent Annual Wellness visit (Pt already had Initial Annual Wellness) and scheduled follow up of multiple significant but stable problems.   RA - monitored by rheumatologist    Denies any breast or gynecological concerns    History/Other:   Fall Risk Assessment:   She has been screened for Falls and is low risk.      Cognitive Assessment:   She had a completely normal cognitive assessment - see flowsheet entries     Functional Ability/Status:   Veronica Cameron has some abnormal functions as listed below:  She has Vision problems based on screening of functional status.       Depression Screening (PHQ):  PHQ-2 SCORE: 0  , done 1/15/2025   If you checked off any problems, how difficult have these problems made it for you to do your work, take care of things at home, or get along with other people?: Not difficult at all             Advanced Directives:   She does NOT have a Living Will. [Do you have a living will?: No]  She does NOT have a Power of  for Health Care. [Do you have a healthcare power of ?: No]  Discussed Advance Care Planning with patient (and family/surrogate if present). Standard forms made available to patient in After Visit Summary.      Patient Active Problem List   Diagnosis    Bee sting allergy    Chronic back pain    Seronegative rheumatoid arthritis (HCC)    Essential hypertension, benign    Special screening for malignant neoplasms, colon    Benign neoplasm of ascending colon    Bilateral carpal tunnel syndrome    Trigger middle finger of left hand     Allergies:  She is allergic to bee venom and pcn [penicillins].    Current Medications:  Outpatient Medications Marked as Taking for the 1/15/25 encounter (Office Visit) with Mike Carrillo MD   Medication Sig    amLODIPine 10 MG Oral Tab Take 1 tablet (10 mg total) by mouth daily.    doxycycline 100 MG Oral Cap Take 1 capsule (100 mg total)  by mouth 2 (two) times daily for 7 days.    albuterol 108 (90 Base) MCG/ACT Inhalation Aero Soln Inhale 2 puffs into the lungs every 4 (four) hours as needed.    benzonatate 200 MG Oral Cap Take 1 capsule (200 mg total) by mouth 3 (three) times daily as needed for cough.    SPIRONOLACTONE 50 MG Oral Tab TAKE ONE TABLET BY MOUTH DAILY    metoprolol succinate ER 50 MG Oral Tablet 24 Hr TAKE ONE TABLET BY MOUTH ONE TIME DAILY    hydroCHLOROthiazide 25 MG Oral Tab Take 1 tablet (25 mg total) by mouth daily.    Turmeric 400 MG Oral Cap Take by mouth.    EPINEPHrine 0.3 MG/0.3ML Injection Solution Auto-injector Inject 0.3 mL (1 each total) into the muscle as needed.    SULFASALAZINE 500 MG Oral Tab TAKE TWO TABLETS BY MOUTH DAILY    Fish Oil-Cholecalciferol (FISH OIL + D3 OR) Take 1 tablet by mouth.       Medical History:  She  has a past medical history of Anxiety state, Arthritis, Bee sting allergy, Chronic back pain, Decorative tattoo, High blood pressure, Osteoarthrosis, unspecified whether generalized or localized, unspecified site, Osteoporosis, Pain in joints, PONV (postoperative nausea and vomiting) (09/2022), Unspecified essential hypertension, Visual impairment, and Wears glasses.  Surgical History:  She  has a past surgical history that includes [place] biopsy stereo nodule 2 site bilat (cpt=19081/87674) (08/2017); [place] biopsy stereo nodule 1 site left (cpt=19081) (08/2017); colonoscopy; and needle biopsy left (Left, 07/21/2023).   Family History:  Her family history includes Cancer in her father and paternal grandmother; Hypertension in her son; Other in her mother; Ovarian Cancer (age of onset: 86) in her paternal grandmother.  Social History:  She  reports that she quit smoking about 45 years ago. Her smoking use included cigarettes. She started smoking about 47 years ago. She has been exposed to tobacco smoke. She has never used smokeless tobacco. She reports current alcohol use of about 7.0 standard drinks of  alcohol per week. She reports that she does not use drugs.    Tobacco:  She smoked tobacco in the past but quit greater than 12 months ago.  Social History     Tobacco Use   Smoking Status Former    Current packs/day: 0.00    Types: Cigarettes    Start date: 1978    Quit date: 1980    Years since quittin.0    Passive exposure: Past   Smokeless Tobacco Never        CAGE Alcohol Screen:   CAGE screening score of 0 on 1/15/2025, showing low risk of alcohol abuse.      Patient Care Team:  Mike Carrillo MD as PCP - General (Family Medicine)  Eric Lopez PT as Physical Therapist    Review of Systems  As per HPI and all other systems reviewed and are negative      Objective:   Physical Exam      /82   Pulse 72   Temp 97.9 °F (36.6 °C) (Temporal)   Resp 18   Ht 5' 3.39\" (1.61 m)   Wt 151 lb 12.8 oz (68.9 kg)   SpO2 95%   BMI 26.56 kg/m²  Estimated body mass index is 26.56 kg/m² as calculated from the following:    Height as of this encounter: 5' 3.39\" (1.61 m).    Weight as of this encounter: 151 lb 12.8 oz (68.9 kg).  Physical Exam  Constitutional:       Appearance: Normal appearance.   HEENT:      Head: Normocephalic and atraumatic.      Eyes: PERRLA no notable nystagmus     Ears: normal on observation     Nose: Nose normal.      Mouth: Mucous membranes are moist.      Neck: no masses no bruit  Cardiovascular:      Rate and Rhythm: Normal rate and regular rhythm.   Pulmonary:      Effort: Pulmonary effort is normal.      Breath sounds: Normal breath sounds.   Abdominal:      General: Bowel sounds are normal.      Palpations: Abdomen is soft. There is no mass.   Musculoskeletal:         General: Normal range of motion.      Cervical back: Normal range of motion.   Skin:     General: Skin is warm and dry.   Neurological:      General: No focal deficit present.      Mental Status: She is alert and oriented to person, place, and time.   Psychiatric:         Mood and Affect: Mood  normal.         Thought Content: Thought content normal.     Medicare Hearing Assessment:   Hearing Screening    Time taken: 1/15/2025  9:05 AM  Screening Method: Finger Rub  Finger Rub Result: Pass         Visual Acuity:   Right Eye Visual Acuity: Corrected Right Eye Chart Acuity: 20/20   Left Eye Visual Acuity: Corrected Left Eye Chart Acuity: 20/20   Both Eyes Visual Acuity: Corrected Both Eyes Chart Acuity: 20/20            Assessment & Plan:   Veronica Cameron is a 64 year old female who presents for a Medicare Assessment.     1. Encounter for subsequent annual wellness visit (AWV) in Medicare patient (Primary)  2. Essential hypertension, benign  -     CBC With Differential With Platelet; Future; Expected date: 01/15/2025  -     Comp Metabolic Panel (14); Future; Expected date: 01/15/2025  -     Lipid Panel; Future; Expected date: 01/15/2025  -     Microalb/Creat Ratio, Random Urine; Future; Expected date: 01/15/2025  3. Mixed hyperlipidemia  -     Lipid Panel; Future; Expected date: 01/15/2025  4. Elevated serum creatinine  -     Comp Metabolic Panel (14); Future; Expected date: 01/15/2025  5. Encounter for screening mammogram for high-risk patient  -     [facility] TRACEY 2D+3D SCREENING BILAT (CPT=77067/76793); Future; Expected date: 02/14/2025  6. Screening, ischemic heart disease  -     CT CALCIUM SCORING; Future; Expected date: 01/15/2025  7. Seronegative rheumatoid arthritis (HCC)    The patient indicates understanding of these issues and agrees to the plan.  Imaging studies ordered.  Lab work ordered.  Reinforced healthy diet, lifestyle, and exercise.      No follow-ups on file.     Mike Carrillo MD, 1/15/2025     Supplementary Documentation:   General Health:  In the past six months, have you lost more than 10 pounds without trying?: 2 - No  Has your appetite been poor?: No  Type of Diet: Balanced;Low Carb  How does the patient maintain a good energy level?: Appropriate Exercise  How would you describe  your daily physical activity?: Moderate  How would you describe your current health state?: Good  How do you maintain positive mental well-being?: Social Interaction;Games;Puzzles;Visiting Friends;Visiting Family  On a scale of 0 to 10, with 0 being no pain and 10 being severe pain, what is your pain level?: 2 - (Mild)  In the past six months, have you experienced urine leakage?: 0-No  At any time do you feel concerned for the safety/well-being of yourself and/or your children, in your home or elsewhere?: No  Have you had any immunizations at another office such as Influenza, Hepatitis B, Tetanus, or Pneumococcal?: Yes    Health Maintenance   Topic Date Due    Pneumococcal Vaccine: 50+ Years (1 of 1 - PCV) Never done    Annual Depression Screening  01/01/2025    Fall Risk Screening (Annual)  01/01/2025    Annual Physical  01/30/2025    HTN: BP Follow-Up  02/10/2025    Mammogram  02/14/2025    Colorectal Cancer Screening  01/23/2034    Influenza Vaccine  Completed    DEXA Scan  Completed    Zoster Vaccines  Completed    COVID-19 Vaccine  Completed    Meningococcal B Vaccine  Aged Out

## 2025-01-19 ENCOUNTER — HOSPITAL ENCOUNTER (OUTPATIENT)
Dept: CT IMAGING | Age: 65
Discharge: HOME | End: 2025-01-19
Attending: FAMILY MEDICINE

## 2025-01-19 DIAGNOSIS — Z13.6 SCREENING, ISCHEMIC HEART DISEASE: ICD-10-CM

## 2025-01-31 ENCOUNTER — TELEMEDICINE (OUTPATIENT)
Dept: FAMILY MEDICINE CLINIC | Facility: CLINIC | Age: 65
End: 2025-01-31
Payer: MEDICARE

## 2025-01-31 DIAGNOSIS — I25.10 CORONARY ARTERY DISEASE DUE TO CALCIFIED CORONARY LESION: Primary | ICD-10-CM

## 2025-01-31 DIAGNOSIS — I51.9 HEART DISEASE: ICD-10-CM

## 2025-01-31 DIAGNOSIS — I25.84 CORONARY ARTERY DISEASE DUE TO CALCIFIED CORONARY LESION: Primary | ICD-10-CM

## 2025-01-31 PROCEDURE — 99214 OFFICE O/P EST MOD 30 MIN: CPT | Performed by: FAMILY MEDICINE

## 2025-01-31 RX ORDER — ROSUVASTATIN CALCIUM 10 MG/1
10 TABLET, COATED ORAL NIGHTLY
Qty: 30 TABLET | Refills: 2 | Status: SHIPPED | OUTPATIENT
Start: 2025-01-31

## 2025-01-31 NOTE — PROGRESS NOTES
Telehealth outside of [facility]  Telehealth Verbal Consent   I conducted a telehealth visit with Veronica Cameron today, 01/31/25, which was completed using two-way, real-time interactive audio and video communication. This has been done in good neel to provide continuity of care in the best interest of the provider-patient relationship, due to the COVID -19 public health crisis/national emergency where restrictions of face-to-face office visits are ongoing. Every conscious effort was taken to allow for sufficient and adequate time to complete the visit.  The patient was made aware of the limitations of the telehealth visit, including treatment limitations as no physical exam could be performed.  The patient was advised to call 911 or to go to the ER in case there was an emergency.  The patient was also advised of the potential privacy & security concerns related to the telehealth platform.   The patient was made aware of where to find [facility]'s notice of privacy practices, telehealth consent form and other related consent forms and documents.  which are located on the [facility] website. The patient verbally agreed to telehealth consent form, related consents and the risks discussed.    Lastly, the patient confirmed that they were in [place].   Included in this visit, time may have been spent reviewing labs, medications, radiology tests and decision making. Appropriate medical decision-making and tests are ordered as detailed in the plan of care above.  Coding/billing information is submitted for this visit based on complexity of care and/or time spent for the visit.    Veronica Cameron is a 64 year old female.    Chief Complaint   Patient presents with    Test Results       HPI:   Patient is here for follow-up on his heart scan which shows that she has multivessel cardiac disease that is mild.  She has been having a longstanding history of hyperlipidemia she is advised to take statin therapy and risks and benefits were discussed  and she is agreeable to taking this and we will repeat blood work in 3 months.  Patient also agreeable to touching base with cardiology for risk management and has no other concerns at this time    Patient Active Problem List   Diagnosis    Bee sting allergy    Chronic back pain    Seronegative rheumatoid arthritis (HCC)    Essential hypertension, benign    Special screening for malignant neoplasms, colon    Benign neoplasm of ascending colon    Bilateral carpal tunnel syndrome    Trigger middle finger of left hand     Current Outpatient Medications   Medication Sig Dispense Refill    rosuvastatin 10 MG Oral Tab Take 1 tablet (10 mg total) by mouth nightly. 30 tablet 2    amLODIPine 10 MG Oral Tab Take 1 tablet (10 mg total) by mouth daily. 90 tablet 0    albuterol 108 (90 Base) MCG/ACT Inhalation Aero Soln Inhale 2 puffs into the lungs every 4 (four) hours as needed. 1 each 0    benzonatate 200 MG Oral Cap Take 1 capsule (200 mg total) by mouth 3 (three) times daily as needed for cough. 30 capsule 0    SPIRONOLACTONE 50 MG Oral Tab TAKE ONE TABLET BY MOUTH DAILY 90 tablet 0    metoprolol succinate ER 50 MG Oral Tablet 24 Hr TAKE ONE TABLET BY MOUTH ONE TIME DAILY 90 tablet 1    hydroCHLOROthiazide 25 MG Oral Tab Take 1 tablet (25 mg total) by mouth daily. 90 tablet 3    Turmeric 400 MG Oral Cap Take by mouth.      EPINEPHrine 0.3 MG/0.3ML Injection Solution Auto-injector Inject 0.3 mL (1 each total) into the muscle as needed. 2 each 0    HYDROXYCHLOROQUINE 200 MG Oral Tab TAKE TWO TABLETS BY MOUTH DAILY (Patient taking differently: Take 1.5 tablets (300 mg total) by mouth daily. TAKE TWO TABLETS BY MOUTH DAILY) 180 tablet 2    SULFASALAZINE 500 MG Oral Tab TAKE TWO TABLETS BY MOUTH DAILY 180 tablet 1    Fish Oil-Cholecalciferol (FISH OIL + D3 OR) Take 1 tablet by mouth.        Past Medical History:    Anxiety state    Arthritis    Bee sting allergy    has epi pen    Chronic back pain    d/t lumbar ddd    Decorative  tattoo    High blood pressure    Osteoarthrosis, unspecified whether generalized or localized, unspecified site    and likely rheumatoid arthritis    Osteoporosis    Pain in joints    PONV (postoperative nausea and vomiting)    Vomiting after knee procedure    Unspecified essential hypertension    Visual impairment    Glasses/Contacts    Wears glasses      Social History:  Social History     Socioeconomic History    Marital status:    Tobacco Use    Smoking status: Former     Current packs/day: 0.00     Types: Cigarettes     Start date: 1978     Quit date: 1980     Years since quittin.1     Passive exposure: Past    Smokeless tobacco: Never   Vaping Use    Vaping status: Never Used   Substance and Sexual Activity    Alcohol use: Yes     Alcohol/week: 7.0 standard drinks of alcohol     Types: 7 Glasses of wine per week     Comment: 1-2/day    Drug use: No     Social Drivers of Health     Food Insecurity: No Food Insecurity (1/15/2025)    NCSS - Food Insecurity     Worried About Running Out of Food in the Last Year: No     Ran Out of Food in the Last Year: No   Transportation Needs: No Transportation Needs (1/15/2025)    NCSS - Transportation     Lack of Transportation: No   Housing Stability: Not At Risk (1/15/2025)    NCSS - Housing/Utilities     Has Housing: Yes     Worried About Losing Housing: No     Unable to Get Utilities: No     Family History   Problem Relation Age of Onset    Other (Other) Mother         going into assisted living, start of dementia,    Hypertension Son     Cancer Paternal Grandmother         female cancer she survived in her 80s    Ovarian Cancer Paternal Grandmother 86    Cancer Father         Allergies  Allergies[1]    REVIEW OF SYSTEMS:   As per HPI all other systems are negative    EXAM:   GENERAL: well developed, well nourished,in no apparent distress. Pt is speaking in full sentences without any cognitive impairment. Further physical exam could not be performed due  to interview being conducted over telemedicine medium      ASSESSMENT AND PLAN:     Encounter Diagnoses   Name Primary?    Heart disease     Coronary artery disease due to calcified coronary lesion Yes       Orders Placed This Encounter   Procedures    Comp Metabolic Panel (14) [E]    Lipid Panel [E]       Meds & Refills for this Visit:  Requested Prescriptions     Signed Prescriptions Disp Refills    rosuvastatin 10 MG Oral Tab 30 tablet 2     Sig: Take 1 tablet (10 mg total) by mouth nightly.       Imaging & Consults:  CARDIO - INTERNAL  Plan of care is as per HPI  No follow-ups on file.  There are no Patient Instructions on file for this visit.      This note was created by [employer] voice recognition. Errors in content may be related to improper recognition by the system; efforts to review and correct have been done but errors may still exist. Please be advised the primary purpose of this note is for me to communicate medical care. Standard sentence structure is not always used. Medical terminology and medical abbreviations may be used. There may be grammatical, typographical, and automated fill ins that may have errors missed in proofreading.          [1]   Allergies  Allergen Reactions    Bee Venom ANAPHYLAXIS     Bee Stings    Pcn [Penicillins] ANAPHYLAXIS and SWELLING     Per patient happened when she was a child (9 or 10 yrs old). Pt remembers having strep throat, she received a penicillin shot. She recalls having hives (raised), SOB, she doesn't recall being hospitalized.

## 2025-02-13 RX ORDER — HYDROCHLOROTHIAZIDE 25 MG/1
25 TABLET ORAL DAILY
Qty: 90 TABLET | Refills: 0 | Status: SHIPPED | OUTPATIENT
Start: 2025-02-13

## 2025-02-17 ENCOUNTER — HOSPITAL ENCOUNTER (OUTPATIENT)
Dept: MAMMOGRAPHY | Facility: HOSPITAL | Age: 65
Discharge: HOME | End: 2025-02-17
Attending: FAMILY MEDICINE
Payer: MEDICARE

## 2025-02-17 DIAGNOSIS — Z12.31 ENCOUNTER FOR SCREENING MAMMOGRAM FOR HIGH-RISK PATIENT: ICD-10-CM

## 2025-02-17 PROCEDURE — 77067 SCR MAMMO BI INCL CAD: CPT | Performed by: FAMILY MEDICINE

## 2025-02-17 PROCEDURE — 77063 BREAST TOMOSYNTHESIS BI: CPT | Performed by: FAMILY MEDICINE

## 2025-03-24 RX ORDER — SPIRONOLACTONE 50 MG/1
50 TABLET, FILM COATED ORAL DAILY
Qty: 90 TABLET | Refills: 0 | Status: SHIPPED | OUTPATIENT
Start: 2025-03-24

## 2025-03-24 NOTE — TELEPHONE ENCOUNTER
Requested Renewals     Name from pharmacy: Spironolactone 50 Mg Tab Nort         Will file in chart as: SPIRONOLACTONE 50 MG Oral Tab    Sig: TAKE ONE TABLET BY MOUTH DAILY    Disp: 90 tablet    Refills: 0    Start: 3/24/2025    Class: Normal    Non-formulary    Last ordered: 3 months ago (12/19/2024) by Mike Carrillo MD    Last refill: 12/19/2024    Rx #: [id number]    Hypertension Medications Protocol Prxvpy8603/24/2025 07:42 AM   Protocol Details CMP or BMP in past 12 months    Last BP reading less than 140/90    In person appointment or virtual visit in the past 12 mos or appointment in next 3 mos    EGFRCR or GFRNAA > 50    Medication is active on med list      To be filled at: [id number] - [place], [place] - [address] [facility] [phone], [phone]

## 2025-04-10 RX ORDER — AMLODIPINE BESYLATE 10 MG/1
10 TABLET ORAL DAILY
Qty: 90 TABLET | Refills: 0 | Status: SHIPPED | OUTPATIENT
Start: 2025-04-10

## 2025-04-10 NOTE — TELEPHONE ENCOUNTER
Hypertension Medications Protocol Ssxqsj35/10/2025 08:21 AM   Protocol Details CMP or BMP in past 12 months    Last BP reading less than 140/90    In person appointment or virtual visit in the past 12 mos or appointment in next 3 mos    EGFRCR or GFRNAA > 50    Medication is active on med list

## 2025-04-28 DIAGNOSIS — I25.84 CORONARY ARTERY DISEASE DUE TO CALCIFIED CORONARY LESION: ICD-10-CM

## 2025-04-28 DIAGNOSIS — I25.10 CORONARY ARTERY DISEASE DUE TO CALCIFIED CORONARY LESION: ICD-10-CM

## 2025-04-28 RX ORDER — ROSUVASTATIN CALCIUM 10 MG/1
10 TABLET, COATED ORAL NIGHTLY
Qty: 30 TABLET | Refills: 0 | Status: SHIPPED | OUTPATIENT
Start: 2025-04-28

## 2025-04-28 NOTE — TELEPHONE ENCOUNTER
Requested Renewals     Name from pharmacy: Rosuvastatin Calcium 10 Mg Tab Nort         Will file in chart as: ROSUVASTATIN 10 MG Oral Tab    Sig: Take 1 tablet (10 mg total) by mouth nightly.    Disp: 30 tablet    Refills: 0    Start: 4/28/2025    Class: Normal    Non-formulary For: Coronary artery disease due to calcified coronary lesion    Last ordered: 2 months ago (1/31/2025) by Mike Carrillo MD    Last refill: 3/31/2025    Rx #: [id number]    Cholesterol Medication Protocol Skaoix3504/28/2025 04:13 AM   Protocol Details ALT < 80    ALT resulted within past year    Lipid panel within past 12 months    In person appointment or virtual visit in the past 12 mos or appointment in next 3 mos    Medication is active on med list      To be filled at: [id number] - [place], [place] - [address] [facility] [phone], [phone]

## 2025-05-23 RX ORDER — HYDROCHLOROTHIAZIDE 25 MG/1
25 TABLET ORAL DAILY
Qty: 90 TABLET | Refills: 0 | Status: SHIPPED | OUTPATIENT
Start: 2025-05-23

## 2025-05-23 NOTE — TELEPHONE ENCOUNTER
Hypertension Medications Protocol Kvmxbb4505/22/2025 06:05 PM   Protocol Details CMP or BMP in past 12 months    Last BP reading less than 140/90    In person appointment or virtual visit in the past 12 mos or appointment in next 3 mos    EGFRCR or GFRNAA > 50    Medication is active on med list     
without difficulty

## 2025-05-30 DIAGNOSIS — I10 ESSENTIAL HYPERTENSION, BENIGN: ICD-10-CM

## 2025-05-30 RX ORDER — METOPROLOL SUCCINATE 50 MG/1
50 TABLET, EXTENDED RELEASE ORAL DAILY
Qty: 90 TABLET | Refills: 0 | Status: SHIPPED | OUTPATIENT
Start: 2025-05-30

## 2025-05-30 NOTE — TELEPHONE ENCOUNTER
Hypertension Medications Protocol Wkznxu2205/30/2025 04:13 AM   Protocol Details CMP or BMP in past 12 months    Last BP reading less than 140/90    In person appointment or virtual visit in the past 12 mos or appointment in next 3 mos    EGFRCR or GFRNAA > 50    Medication is active on med list

## 2025-06-03 DIAGNOSIS — I25.84 CORONARY ARTERY DISEASE DUE TO CALCIFIED CORONARY LESION: ICD-10-CM

## 2025-06-03 DIAGNOSIS — I10 ESSENTIAL HYPERTENSION, BENIGN: ICD-10-CM

## 2025-06-03 DIAGNOSIS — I25.10 CORONARY ARTERY DISEASE DUE TO CALCIFIED CORONARY LESION: ICD-10-CM

## 2025-06-03 RX ORDER — ROSUVASTATIN CALCIUM 10 MG/1
10 TABLET, COATED ORAL NIGHTLY
Qty: 30 TABLET | Refills: 0 | Status: SHIPPED | OUTPATIENT
Start: 2025-06-03

## 2025-06-03 RX ORDER — METOPROLOL SUCCINATE 50 MG/1
50 TABLET, EXTENDED RELEASE ORAL DAILY
Qty: 90 TABLET | Refills: 0 | OUTPATIENT
Start: 2025-06-03

## 2025-06-03 NOTE — TELEPHONE ENCOUNTER
Cholesterol Medication Protocol Qdfjuw7106/03/2025 08:47 AM   Protocol Details ALT < 80    ALT resulted within past year    Lipid panel within past 12 months    In person appointment or virtual visit in the past 12 mos or appointment in next 3 mos    Medication is active on med list          Hypertension Medications Protocol Wqeryl5206/03/2025 08:47 AM   Protocol Details CMP or BMP in past 12 months    Last BP reading less than 140/90    In person appointment or virtual visit in the past 12 mos or appointment in next 3 mos    EGFRCR or GFRNAA > 50    Medication is active on med list

## 2025-06-19 RX ORDER — SPIRONOLACTONE 50 MG/1
50 TABLET, FILM COATED ORAL DAILY
Qty: 90 TABLET | Refills: 0 | Status: SHIPPED | OUTPATIENT
Start: 2025-06-19

## 2025-06-19 NOTE — TELEPHONE ENCOUNTER
Hypertension Medications Protocol Rlobpw6006/19/2025 09:12 AM   Protocol Details CMP or BMP in past 12 months    Last BP reading less than 140/90    In person appointment or virtual visit in the past 12 mos or appointment in next 3 mos    EGFRCR or GFRNAA > 50    Medication is active on med list

## 2025-06-27 DIAGNOSIS — I25.84 CORONARY ARTERY DISEASE DUE TO CALCIFIED CORONARY LESION: ICD-10-CM

## 2025-06-27 DIAGNOSIS — I25.10 CORONARY ARTERY DISEASE DUE TO CALCIFIED CORONARY LESION: ICD-10-CM

## 2025-06-30 RX ORDER — ROSUVASTATIN CALCIUM 10 MG/1
10 TABLET, COATED ORAL NIGHTLY
Qty: 90 TABLET | Refills: 3 | Status: SHIPPED | OUTPATIENT
Start: 2025-06-30

## 2025-06-30 NOTE — TELEPHONE ENCOUNTER
Refill passes per [facility] protocol.    No future appointments with primary care medicine

## 2025-07-08 RX ORDER — AMLODIPINE BESYLATE 10 MG/1
10 TABLET ORAL DAILY
Qty: 90 TABLET | Refills: 3 | Status: SHIPPED | OUTPATIENT
Start: 2025-07-08

## 2025-08-26 DIAGNOSIS — I10 ESSENTIAL HYPERTENSION, BENIGN: ICD-10-CM

## 2025-08-29 RX ORDER — METOPROLOL SUCCINATE 50 MG/1
50 TABLET, EXTENDED RELEASE ORAL DAILY
Qty: 90 TABLET | Refills: 0 | Status: SHIPPED | OUTPATIENT
Start: 2025-08-29

## 2025-08-29 RX ORDER — HYDROCHLOROTHIAZIDE 25 MG/1
25 TABLET ORAL DAILY
Qty: 90 TABLET | Refills: 0 | Status: SHIPPED | OUTPATIENT
Start: 2025-08-29

## (undated) DIAGNOSIS — N39.0 URINARY TRACT INFECTION WITHOUT HEMATURIA, SITE UNSPECIFIED: Primary | ICD-10-CM

## (undated) DEVICE — STOCKINETTE,DOUBLE PLY,4X54,STERILE: Brand: MEDLINE

## (undated) DEVICE — DISPOSABLE TOURNIQUET CUFF SINGLE BLADDER, DUAL PORT AND QUICK CONNECT CONNECTOR: Brand: COLOR CUFF

## (undated) DEVICE — BANDAGE COMPR L5YDXW2IN FOAM CO FLX

## (undated) DEVICE — STANDARD HYPODERMIC NEEDLE,POLYPROPYLENE HUB: Brand: MONOJECT

## (undated) DEVICE — COVER,MAYO STAND,STERILE: Brand: MEDLINE

## (undated) DEVICE — SUT ETHLN 4-0 18IN PS-2 NABSRB BLK 19MM 3/8 C

## (undated) DEVICE — PADDING CAST W2INXL4YD ST COHESIVE LP

## (undated) DEVICE — DISPOSABLE BIPOLAR FORCEPS 4" (10.2CM) JEWELERS, STRAIGHT 0.4MM TIP AND 12 FT. (3.6M) CABLE: Brand: KIRWAN

## (undated) DEVICE — GLOVE SUR 7 SENSICARE PI PIP CRM PWD F

## (undated) DEVICE — OCCLUSIVE GAUZE STRIP OVERWRAP,3% BISMUTH TRIBROMOPHENATE IN PETROLATUM BLEND: Brand: XEROFORM

## (undated) DEVICE — UPPER EXTREMITY CDS-LF: Brand: MEDLINE INDUSTRIES, INC.

## (undated) DEVICE — SOLUTION IRRIG 1000ML 0.9% NACL USP BTL

## (undated) DEVICE — KIT,ANTI FOG,W/SPONGE & FLUID,SOFT PACK: Brand: MEDLINE

## (undated) DEVICE — GLOVE SUR 6.5 SENSICARE PI PIP CRM PWD F

## (undated) NOTE — LETTER
Veronica Cameron   [address] [place] [address]           Dear Veronica Cameron     Our records indicate that you have outstanding lab work and or testing that was ordered for you and has not yet been completed:  Lab Frequency Next Occurrence   US BREAST LEFT COMPLETE (CPT=76641) Once 01/03/2024   [facility] TRACEY 2D+3D SCREENING AUGMT LEFT(52253-72/80819) Once 01/03/2024      To provide you with the best possible care, please complete these orders at your earliest convenience. If you have recently completed these orders please disregard this letter.     If you have any questions please call the office at [phone].     Thank you,     [facility]

## (undated) NOTE — MR AVS SNAPSHOT
[facility] [address]  [phone]               Thank you for choosing us for your health care visit with Leonid De La Torre PT.   We are glad to serve you and happy to provide you with this summary of Current Medications          This list is accurate as of: 4/21/17 11:59 PM.  Always use your most recent med list.                Katarzyna Rosales 2-JASVIR 0.3 MG/0.3ML Trice   Generic drug:  EPINEPHrine   Inject  as directed.            FISH OIL + D3 OR   Take  by mouth

## (undated) NOTE — Clinical Note
Patient Name: Susanne Montana  YOB: 1960          MRN number:  [id number]  Date:  5/5/2017  Referring Physician:  Bert Roberts    Discharge Summary  Number of Visits Attended 4 in [facility]    Dear Dr. Zoila Haskins,  Subjective:    The patient re Plan: The patient will be discharged from outpatient physical therapy and continue independently with a home exercise program       Thank you for your referral. If you have any questions, please contact me at Dept: [phone].     Sincerely,  Electronical

## (undated) NOTE — LETTER
23    Patient: Conor Banks  : 1960 Visit date: 2023    Dear  Swathi Nayak MD    Thank you for referring Conor Banks to my practice. Please find my assessment and plan below. I saw Marin Conteh in my office, she presents with a resolving presumed sebaceous abscess of her scalp in the right parietal area on examination today the area has some induration no evidence of fluctuance. I am going to wait 6 weeks for the sebaceous cyst to evolve in the tissues to reorganize at that time I will see her back and we will consider excision.   Thank you Stanley  Sincerely,       Presley Blackwell DO   CC:   No Recipients

## (undated) NOTE — LETTER
Veronica Cameron   [address] [facility] [address]           Dear Veronica Cameron     Our records indicate that you have outstanding lab work and or testing that was ordered for you and has not yet been completed:  Lab Frequency Next Occurrence    PVC20 (Prevnar 20) Vaccine   To provide you with the best possible care, please complete these orders at your earliest convenience. If you have recently completed these orders please disregard this letter.     If you have any questions please call the office at [phone].     Thank you,     [facility]

## (undated) NOTE — MR AVS SNAPSHOT
[address] [facility] [address] [phone]               Thank you for choosing us for your health care visit with [facility].   We are glad to serve you and happy to provide you with this You can access your MyChart to more actively manage your health care and view more details from this visit by going to [email or web address].   If you've recently had a stay at the Hospital you can access your discharge instructions in [address] by sanjay

## (undated) NOTE — MR AVS SNAPSHOT
[facility] [address]  [phone]               Thank you for choosing us for your health care visit with Adriana Jaquez PT.   We are glad to serve you and happy to provide you with this summary of This list is accurate as of: 5/5/17  3:39 PM.  Always use your most recent med list.                Eugenie Islas 2-JASVIR 0.3 MG/0.3ML Trice   Generic drug:  EPINEPHrine   Inject  as directed. FISH OIL + D3 OR   Take  by mouth.            [address] [facility]

## (undated) NOTE — MR AVS SNAPSHOT
[facility] [address]  [phone]               Thank you for choosing us for your health care visit with Bee Ray PT.   We are glad to serve you and happy to provide you with this summary of This list is accurate as of: 4/25/17  4:58 PM.  Always use your most recent med list.                Keegan Barksdale 2-JASVIR 0.3 MG/0.3ML Trice   Generic drug:  EPINEPHrine   Inject  as directed. FISH OIL + D3 OR   Take  by mouth.            [address] [facility]

## (undated) NOTE — MR AVS SNAPSHOT
[address] [facility] [address]  [phone]               Thank you for choosing us for your [facility] care visit [facility].   We are glad to serve you and happy to provide you with this You can access your MyChart to more actively manage your health care and view more details from this visit by going to [email or web address].   If you've recently had a stay at the Hospital you can access your discharge instructions in [address] by sanjay You don’t need to join a gym. Home exercises work great.  Put more priority on exercise in your life                    Visit [facility] online at  [email or web address]

## (undated) NOTE — LETTER
[address] [facility] [address]  [address]  [facility] [place] [address] [address]            11/12/2018          Dear Patient,     We have be

## (undated) NOTE — MR AVS SNAPSHOT
[address] [facility] [address]  [phone]               Thank you for choosing us for your health care visit with [facility].   We are glad to serve you and happy to provide you with this Take 2 tablets (400 mg total) by mouth daily. Commonly known as:  PLAQUENIL           losartan 100 MG Tabs   Take 1 tablet (100 mg total) by mouth daily.    Commonly known as:  COZAAR           sulfaSALAzine 500 MG Tabs   Take 2 tablets (1,000 mg total) b

## (undated) NOTE — MR AVS SNAPSHOT
After Visit Summary   2/2/2021    Caroline Mazariegos    MRN: [id number]           Visit Information     Date & Time  2/2/2021  2:15 PM Provider  Dov Garcia MD Department  [place] [address], [facility] Liz Saldivar Dept.  Phone  [phone] Lipid screening   [510701]    Vitamin D deficiency   [141087]    Screening mammogram, encounter for   [7272264]    Skin cancer screening   [711563]    Rheumatoid arthritis of multiple sites with negative rheumatoid factor   [2734501]             We Ordered Instructions: Your order will generate a \"Scheduling Ticket\" that will be available in [employer] to schedule on your own at a time most convenient to you.       If you do not have a [employer] Account, or if you prefer to speak with someone to schedule your ap Primary Care Providers  Treatment for acute illness   or injury that are   non-life-threatening.   Also available by appointment     Average cost  $70*       [facility]    [facility] [address] [facility]

## (undated) NOTE — MR AVS SNAPSHOT
[facility] [address]  [phone]               Thank you for choosing us for your health care visit with Esa Noble PT.   We are glad to serve you and happy to provide you with this summary of This list is accurate as of: 5/2/17  4:46 PM.  Always use your most recent med list.                Shelbie Bergmanp 2-JASVIR 0.3 MG/0.3ML Trice   Generic drug:  EPINEPHrine   Inject  as directed. FISH OIL + D3 OR   Take  by mouth.            [address] [facility]